# Patient Record
Sex: FEMALE | Race: WHITE | NOT HISPANIC OR LATINO | Employment: FULL TIME | URBAN - METROPOLITAN AREA
[De-identification: names, ages, dates, MRNs, and addresses within clinical notes are randomized per-mention and may not be internally consistent; named-entity substitution may affect disease eponyms.]

---

## 2018-03-12 ENCOUNTER — TELEMEDICINE2 (OUTPATIENT)
Dept: URGENT CARE | Facility: CLINIC | Age: 38
End: 2018-03-12

## 2018-03-12 DIAGNOSIS — M62.838 MUSCLE SPASM: ICD-10-CM

## 2018-03-12 DIAGNOSIS — F43.9 STRESS: ICD-10-CM

## 2018-03-12 PROCEDURE — 99214 OFFICE O/P EST MOD 30 MIN: CPT | Performed by: FAMILY MEDICINE

## 2018-03-12 RX ORDER — CYCLOBENZAPRINE HCL 10 MG
10 TABLET ORAL 3 TIMES DAILY PRN
Qty: 30 TAB | Refills: 0 | Status: SHIPPED | OUTPATIENT
Start: 2018-03-12

## 2018-03-12 RX ORDER — ALPRAZOLAM 0.5 MG/1
0.5 TABLET ORAL NIGHTLY PRN
COMMUNITY

## 2018-03-12 NOTE — PROGRESS NOTES
Subjective:      Lauryn Aguillon is a 38 y.o. female who presents with Medication Problem (pt wanting to get back on anxiety & muscle reler meds)        Patient was presented for a telehealth consultation via secure and encrypted videoconferencing technology.    It was explained to patient that this encounter was done using secure and encrypted videoconferencing equipment, the patient gave us consent and patient ID was confirmed for us.        - says has long hx of stress anxiety and muscle tension in upper back/neck. Acting up past 2-3 weeks, Uses flexeril and xanax to help. Is out of these meds and unable to see her Dr until later this week and is wanting refills. No SI/HI              HPI    ROS       Objective:     There were no vitals taken for this visit.     Physical Exam  Patient appears through video monitor to be alert and in no distress and breathing normally               Assessment/Plan:         1. Muscle spasm  cyclobenzaprine (FLEXERIL) 10 MG Tab   2. Stress         Explained to patient that an in person visit is of higher quiality care, preferred and recommended and decreases chances of misdiagnoses and/or complications, but as she wishes to use telephone service I will e-scribe flexeril and explained to her I would not be able to send Xanax to store for her.

## 2020-11-19 ENCOUNTER — OFFICE VISIT (OUTPATIENT)
Dept: URGENT CARE | Facility: CLINIC | Age: 40
End: 2020-11-19
Payer: COMMERCIAL

## 2020-11-19 DIAGNOSIS — R00.0 TACHYCARDIA: ICD-10-CM

## 2020-11-19 PROCEDURE — 99214 OFFICE O/P EST MOD 30 MIN: CPT | Performed by: FAMILY MEDICINE

## 2020-11-19 RX ORDER — AZITHROMYCIN 250 MG/1
TABLET, FILM COATED ORAL
Status: ON HOLD | COMMUNITY
Start: 2020-10-29 | End: 2020-11-20

## 2020-11-20 ENCOUNTER — APPOINTMENT (OUTPATIENT)
Dept: CARDIOLOGY | Facility: MEDICAL CENTER | Age: 40
End: 2020-11-20
Attending: STUDENT IN AN ORGANIZED HEALTH CARE EDUCATION/TRAINING PROGRAM
Payer: COMMERCIAL

## 2020-11-20 ENCOUNTER — HOSPITAL ENCOUNTER (OUTPATIENT)
Facility: MEDICAL CENTER | Age: 40
End: 2020-11-20
Attending: EMERGENCY MEDICINE | Admitting: STUDENT IN AN ORGANIZED HEALTH CARE EDUCATION/TRAINING PROGRAM
Payer: COMMERCIAL

## 2020-11-20 ENCOUNTER — APPOINTMENT (OUTPATIENT)
Dept: RADIOLOGY | Facility: MEDICAL CENTER | Age: 40
End: 2020-11-20
Attending: EMERGENCY MEDICINE
Payer: COMMERCIAL

## 2020-11-20 ENCOUNTER — APPOINTMENT (OUTPATIENT)
Dept: RADIOLOGY | Facility: MEDICAL CENTER | Age: 40
End: 2020-11-20
Attending: STUDENT IN AN ORGANIZED HEALTH CARE EDUCATION/TRAINING PROGRAM
Payer: COMMERCIAL

## 2020-11-20 VITALS
RESPIRATION RATE: 13 BRPM | WEIGHT: 242 LBS | DIASTOLIC BLOOD PRESSURE: 74 MMHG | BODY MASS INDEX: 37.9 KG/M2 | TEMPERATURE: 96.5 F | HEART RATE: 121 BPM | OXYGEN SATURATION: 96 % | SYSTOLIC BLOOD PRESSURE: 126 MMHG

## 2020-11-20 VITALS
DIASTOLIC BLOOD PRESSURE: 72 MMHG | WEIGHT: 240.96 LBS | HEIGHT: 67 IN | BODY MASS INDEX: 37.82 KG/M2 | HEART RATE: 92 BPM | TEMPERATURE: 97.8 F | SYSTOLIC BLOOD PRESSURE: 109 MMHG | RESPIRATION RATE: 18 BRPM | OXYGEN SATURATION: 97 %

## 2020-11-20 DIAGNOSIS — R00.0 TACHYCARDIA: ICD-10-CM

## 2020-11-20 DIAGNOSIS — F41.9 ANXIETY: ICD-10-CM

## 2020-11-20 DIAGNOSIS — E05.90 THYROTOXICOSIS WITHOUT THYROID STORM, UNSPECIFIED THYROTOXICOSIS TYPE: ICD-10-CM

## 2020-11-20 DIAGNOSIS — E05.90 HYPERTHYROIDISM: ICD-10-CM

## 2020-11-20 PROBLEM — F17.200 NICOTINE DEPENDENCE DUE TO VAPING NON-TOBACCO PRODUCT: Status: ACTIVE | Noted: 2020-11-20

## 2020-11-20 LAB
ALBUMIN SERPL BCP-MCNC: 4.2 G/DL (ref 3.2–4.9)
ALBUMIN/GLOB SERPL: 1.8 G/DL
ALP SERPL-CCNC: 67 U/L (ref 30–99)
ALT SERPL-CCNC: 30 U/L (ref 2–50)
ANION GAP SERPL CALC-SCNC: 10 MMOL/L (ref 7–16)
AST SERPL-CCNC: 21 U/L (ref 12–45)
BASOPHILS # BLD AUTO: 0.4 % (ref 0–1.8)
BASOPHILS # BLD: 0.03 K/UL (ref 0–0.12)
BILIRUB SERPL-MCNC: 0.7 MG/DL (ref 0.1–1.5)
BUN SERPL-MCNC: 14 MG/DL (ref 8–22)
CALCIUM SERPL-MCNC: 9.5 MG/DL (ref 8.5–10.5)
CHLORIDE SERPL-SCNC: 104 MMOL/L (ref 96–112)
CO2 SERPL-SCNC: 24 MMOL/L (ref 20–33)
COVID ORDER STATUS COVID19: NORMAL
CREAT SERPL-MCNC: 0.53 MG/DL (ref 0.5–1.4)
EKG IMPRESSION: NORMAL
EOSINOPHIL # BLD AUTO: 0.23 K/UL (ref 0–0.51)
EOSINOPHIL NFR BLD: 3.4 % (ref 0–6.9)
ERYTHROCYTE [DISTWIDTH] IN BLOOD BY AUTOMATED COUNT: 33.9 FL (ref 35.9–50)
GLOBULIN SER CALC-MCNC: 2.3 G/DL (ref 1.9–3.5)
GLUCOSE SERPL-MCNC: 103 MG/DL (ref 65–99)
HCT VFR BLD AUTO: 42.7 % (ref 37–47)
HGB BLD-MCNC: 13.9 G/DL (ref 12–16)
IMM GRANULOCYTES # BLD AUTO: 0.02 K/UL (ref 0–0.11)
IMM GRANULOCYTES NFR BLD AUTO: 0.3 % (ref 0–0.9)
LV EJECT FRACT  99904: 60
LV EJECT FRACT MOD 2C 99903: 55.44
LV EJECT FRACT MOD 4C 99902: 75.85
LV EJECT FRACT MOD BP 99901: 66.75
LYMPHOCYTES # BLD AUTO: 2.31 K/UL (ref 1–4.8)
LYMPHOCYTES NFR BLD: 34 % (ref 22–41)
MCH RBC QN AUTO: 26.1 PG (ref 27–33)
MCHC RBC AUTO-ENTMCNC: 32.6 G/DL (ref 33.6–35)
MCV RBC AUTO: 80.3 FL (ref 81.4–97.8)
MONOCYTES # BLD AUTO: 0.86 K/UL (ref 0–0.85)
MONOCYTES NFR BLD AUTO: 12.7 % (ref 0–13.4)
NEUTROPHILS # BLD AUTO: 3.34 K/UL (ref 2–7.15)
NEUTROPHILS NFR BLD: 49.2 % (ref 44–72)
NRBC # BLD AUTO: 0 K/UL
NRBC BLD-RTO: 0 /100 WBC
PLATELET # BLD AUTO: 260 K/UL (ref 164–446)
PMV BLD AUTO: 10.1 FL (ref 9–12.9)
POTASSIUM SERPL-SCNC: 3.7 MMOL/L (ref 3.6–5.5)
PROT SERPL-MCNC: 6.5 G/DL (ref 6–8.2)
RBC # BLD AUTO: 5.32 M/UL (ref 4.2–5.4)
SARS-COV-2 RNA RESP QL NAA+PROBE: NOTDETECTED
SODIUM SERPL-SCNC: 138 MMOL/L (ref 135–145)
SPECIMEN SOURCE: NORMAL
T3FREE SERPL-MCNC: 16 PG/ML (ref 2–4.4)
T4 FREE SERPL-MCNC: 3.45 NG/DL (ref 0.93–1.7)
T4 FREE SERPL-MCNC: 3.46 NG/DL (ref 0.93–1.7)
THYROPEROXIDASE AB SERPL-ACNC: 186 IU/ML (ref 0–9)
TROPONIN T SERPL-MCNC: <6 NG/L (ref 6–19)
TSH SERPL DL<=0.005 MIU/L-ACNC: <0.005 UIU/ML (ref 0.38–5.33)
WBC # BLD AUTO: 6.8 K/UL (ref 4.8–10.8)

## 2020-11-20 PROCEDURE — 700102 HCHG RX REV CODE 250 W/ 637 OVERRIDE(OP): Performed by: STUDENT IN AN ORGANIZED HEALTH CARE EDUCATION/TRAINING PROGRAM

## 2020-11-20 PROCEDURE — 84481 FREE ASSAY (FT-3): CPT

## 2020-11-20 PROCEDURE — 84439 ASSAY OF FREE THYROXINE: CPT

## 2020-11-20 PROCEDURE — 84484 ASSAY OF TROPONIN QUANT: CPT

## 2020-11-20 PROCEDURE — 99406 BEHAV CHNG SMOKING 3-10 MIN: CPT | Performed by: STUDENT IN AN ORGANIZED HEALTH CARE EDUCATION/TRAINING PROGRAM

## 2020-11-20 PROCEDURE — 93306 TTE W/DOPPLER COMPLETE: CPT

## 2020-11-20 PROCEDURE — 700111 HCHG RX REV CODE 636 W/ 250 OVERRIDE (IP): Performed by: EMERGENCY MEDICINE

## 2020-11-20 PROCEDURE — A9270 NON-COVERED ITEM OR SERVICE: HCPCS | Performed by: STUDENT IN AN ORGANIZED HEALTH CARE EDUCATION/TRAINING PROGRAM

## 2020-11-20 PROCEDURE — 76536 US EXAM OF HEAD AND NECK: CPT

## 2020-11-20 PROCEDURE — 96374 THER/PROPH/DIAG INJ IV PUSH: CPT

## 2020-11-20 PROCEDURE — 71045 X-RAY EXAM CHEST 1 VIEW: CPT

## 2020-11-20 PROCEDURE — 93306 TTE W/DOPPLER COMPLETE: CPT | Mod: 26 | Performed by: INTERNAL MEDICINE

## 2020-11-20 PROCEDURE — 80053 COMPREHEN METABOLIC PANEL: CPT

## 2020-11-20 PROCEDURE — 700105 HCHG RX REV CODE 258: Performed by: EMERGENCY MEDICINE

## 2020-11-20 PROCEDURE — 84442 ASSAY OF THYROID ACTIVITY: CPT

## 2020-11-20 PROCEDURE — 99285 EMERGENCY DEPT VISIT HI MDM: CPT

## 2020-11-20 PROCEDURE — 85025 COMPLETE CBC W/AUTO DIFF WBC: CPT

## 2020-11-20 PROCEDURE — 93005 ELECTROCARDIOGRAM TRACING: CPT | Performed by: EMERGENCY MEDICINE

## 2020-11-20 PROCEDURE — 86376 MICROSOMAL ANTIBODY EACH: CPT

## 2020-11-20 PROCEDURE — 99235 HOSP IP/OBS SAME DATE MOD 70: CPT | Mod: 25 | Performed by: STUDENT IN AN ORGANIZED HEALTH CARE EDUCATION/TRAINING PROGRAM

## 2020-11-20 PROCEDURE — 83520 IMMUNOASSAY QUANT NOS NONAB: CPT

## 2020-11-20 PROCEDURE — G0378 HOSPITAL OBSERVATION PER HR: HCPCS

## 2020-11-20 PROCEDURE — 36415 COLL VENOUS BLD VENIPUNCTURE: CPT

## 2020-11-20 PROCEDURE — U0003 INFECTIOUS AGENT DETECTION BY NUCLEIC ACID (DNA OR RNA); SEVERE ACUTE RESPIRATORY SYNDROME CORONAVIRUS 2 (SARS-COV-2) (CORONAVIRUS DISEASE [COVID-19]), AMPLIFIED PROBE TECHNIQUE, MAKING USE OF HIGH THROUGHPUT TECHNOLOGIES AS DESCRIBED BY CMS-2020-01-R: HCPCS

## 2020-11-20 PROCEDURE — 84443 ASSAY THYROID STIM HORMONE: CPT

## 2020-11-20 RX ORDER — CYCLOBENZAPRINE HCL 10 MG
10 TABLET ORAL 3 TIMES DAILY PRN
Status: DISCONTINUED | OUTPATIENT
Start: 2020-11-20 | End: 2020-11-20 | Stop reason: HOSPADM

## 2020-11-20 RX ORDER — ALPRAZOLAM 0.5 MG/1
0.5 TABLET ORAL NIGHTLY PRN
Status: DISCONTINUED | OUTPATIENT
Start: 2020-11-20 | End: 2020-11-20 | Stop reason: HOSPADM

## 2020-11-20 RX ORDER — METHIMAZOLE 5 MG/1
5 TABLET ORAL 3 TIMES DAILY
Status: DISCONTINUED | OUTPATIENT
Start: 2020-11-20 | End: 2020-11-20 | Stop reason: HOSPADM

## 2020-11-20 RX ORDER — PROPRANOLOL HYDROCHLORIDE 20 MG/1
20 TABLET ORAL EVERY 4 HOURS PRN
Status: DISCONTINUED | OUTPATIENT
Start: 2020-11-20 | End: 2020-11-20

## 2020-11-20 RX ORDER — BISACODYL 10 MG
10 SUPPOSITORY, RECTAL RECTAL
Status: DISCONTINUED | OUTPATIENT
Start: 2020-11-20 | End: 2020-11-20 | Stop reason: HOSPADM

## 2020-11-20 RX ORDER — ACETAMINOPHEN 500 MG
1000 TABLET ORAL EVERY 6 HOURS PRN
Status: DISCONTINUED | OUTPATIENT
Start: 2020-11-20 | End: 2020-11-20 | Stop reason: HOSPADM

## 2020-11-20 RX ORDER — LORAZEPAM 2 MG/ML
2 INJECTION INTRAMUSCULAR ONCE
Status: COMPLETED | OUTPATIENT
Start: 2020-11-20 | End: 2020-11-20

## 2020-11-20 RX ORDER — PROPRANOLOL HYDROCHLORIDE 20 MG/1
60 TABLET ORAL ONCE
Status: DISCONTINUED | OUTPATIENT
Start: 2020-11-20 | End: 2020-11-20

## 2020-11-20 RX ORDER — PROMETHAZINE HYDROCHLORIDE 25 MG/1
12.5-25 SUPPOSITORY RECTAL EVERY 4 HOURS PRN
Status: DISCONTINUED | OUTPATIENT
Start: 2020-11-20 | End: 2020-11-20 | Stop reason: HOSPADM

## 2020-11-20 RX ORDER — LABETALOL HYDROCHLORIDE 5 MG/ML
10 INJECTION, SOLUTION INTRAVENOUS EVERY 4 HOURS PRN
Status: DISCONTINUED | OUTPATIENT
Start: 2020-11-20 | End: 2020-11-20 | Stop reason: HOSPADM

## 2020-11-20 RX ORDER — SODIUM CHLORIDE 9 MG/ML
1000 INJECTION, SOLUTION INTRAVENOUS ONCE
Status: COMPLETED | OUTPATIENT
Start: 2020-11-20 | End: 2020-11-20

## 2020-11-20 RX ORDER — PROPRANOLOL HYDROCHLORIDE 20 MG/1
20 TABLET ORAL EVERY 8 HOURS
Qty: 90 TAB | Refills: 11 | Status: SHIPPED | OUTPATIENT
Start: 2020-11-20 | End: 2022-04-05

## 2020-11-20 RX ORDER — ONDANSETRON 2 MG/ML
4 INJECTION INTRAMUSCULAR; INTRAVENOUS EVERY 4 HOURS PRN
Status: DISCONTINUED | OUTPATIENT
Start: 2020-11-20 | End: 2020-11-20 | Stop reason: HOSPADM

## 2020-11-20 RX ORDER — POLYETHYLENE GLYCOL 3350 17 G/17G
1 POWDER, FOR SOLUTION ORAL
Status: DISCONTINUED | OUTPATIENT
Start: 2020-11-20 | End: 2020-11-20 | Stop reason: HOSPADM

## 2020-11-20 RX ORDER — AMOXICILLIN 250 MG
2 CAPSULE ORAL 2 TIMES DAILY
Status: DISCONTINUED | OUTPATIENT
Start: 2020-11-20 | End: 2020-11-20 | Stop reason: HOSPADM

## 2020-11-20 RX ORDER — POTASSIUM CHLORIDE 20 MEQ/1
20 TABLET, EXTENDED RELEASE ORAL DAILY
Status: DISCONTINUED | OUTPATIENT
Start: 2020-11-20 | End: 2020-11-20 | Stop reason: HOSPADM

## 2020-11-20 RX ORDER — NICOTINE 21 MG/24HR
14 PATCH, TRANSDERMAL 24 HOURS TRANSDERMAL
Status: DISCONTINUED | OUTPATIENT
Start: 2020-11-20 | End: 2020-11-20 | Stop reason: HOSPADM

## 2020-11-20 RX ORDER — ONDANSETRON 4 MG/1
4 TABLET, ORALLY DISINTEGRATING ORAL EVERY 4 HOURS PRN
Status: DISCONTINUED | OUTPATIENT
Start: 2020-11-20 | End: 2020-11-20 | Stop reason: HOSPADM

## 2020-11-20 RX ORDER — PROMETHAZINE HYDROCHLORIDE 25 MG/1
12.5-25 TABLET ORAL EVERY 4 HOURS PRN
Status: DISCONTINUED | OUTPATIENT
Start: 2020-11-20 | End: 2020-11-20 | Stop reason: HOSPADM

## 2020-11-20 RX ORDER — ACETAMINOPHEN 325 MG/1
650 TABLET ORAL EVERY 6 HOURS PRN
Status: DISCONTINUED | OUTPATIENT
Start: 2020-11-20 | End: 2020-11-20

## 2020-11-20 RX ORDER — PROPRANOLOL HYDROCHLORIDE 20 MG/1
20 TABLET ORAL EVERY 8 HOURS
Status: DISCONTINUED | OUTPATIENT
Start: 2020-11-20 | End: 2020-11-20 | Stop reason: HOSPADM

## 2020-11-20 RX ORDER — METHIMAZOLE 5 MG/1
10 TABLET ORAL 3 TIMES DAILY
Qty: 90 TAB | Refills: 3 | Status: SHIPPED | OUTPATIENT
Start: 2020-11-20

## 2020-11-20 RX ORDER — PROCHLORPERAZINE EDISYLATE 5 MG/ML
5-10 INJECTION INTRAMUSCULAR; INTRAVENOUS EVERY 4 HOURS PRN
Status: DISCONTINUED | OUTPATIENT
Start: 2020-11-20 | End: 2020-11-20 | Stop reason: HOSPADM

## 2020-11-20 RX ORDER — ACETAMINOPHEN 500 MG
1000 TABLET ORAL EVERY 6 HOURS PRN
Qty: 30 TAB | Refills: 0 | Status: SHIPPED | OUTPATIENT
Start: 2020-11-20 | End: 2023-01-31

## 2020-11-20 RX ADMIN — LORAZEPAM 2 MG: 2 INJECTION INTRAMUSCULAR; INTRAVENOUS at 01:37

## 2020-11-20 RX ADMIN — SODIUM CHLORIDE 1000 ML: 9 INJECTION, SOLUTION INTRAVENOUS at 01:37

## 2020-11-20 RX ADMIN — PROPRANOLOL HYDROCHLORIDE 20 MG: 20 TABLET ORAL at 15:05

## 2020-11-20 RX ADMIN — ACETAMINOPHEN 1000 MG: 500 TABLET ORAL at 09:11

## 2020-11-20 RX ADMIN — POTASSIUM CHLORIDE 20 MEQ: 1500 TABLET, EXTENDED RELEASE ORAL at 11:22

## 2020-11-20 RX ADMIN — PROPRANOLOL HYDROCHLORIDE 20 MG: 20 TABLET ORAL at 04:35

## 2020-11-20 ASSESSMENT — COGNITIVE AND FUNCTIONAL STATUS - GENERAL
MOBILITY SCORE: 24
SUGGESTED CMS G CODE MODIFIER DAILY ACTIVITY: CH
SUGGESTED CMS G CODE MODIFIER MOBILITY: CH
DAILY ACTIVITIY SCORE: 24

## 2020-11-20 ASSESSMENT — PATIENT HEALTH QUESTIONNAIRE - PHQ9
2. FEELING DOWN, DEPRESSED, IRRITABLE, OR HOPELESS: NOT AT ALL
1. LITTLE INTEREST OR PLEASURE IN DOING THINGS: NOT AT ALL
SUM OF ALL RESPONSES TO PHQ9 QUESTIONS 1 AND 2: 0

## 2020-11-20 ASSESSMENT — LIFESTYLE VARIABLES
TOTAL SCORE: 0
EVER FELT BAD OR GUILTY ABOUT YOUR DRINKING: NO
HAVE YOU EVER FELT YOU SHOULD CUT DOWN ON YOUR DRINKING: NO
DO YOU DRINK ALCOHOL: NO
ALCOHOL_USE: NO
AVERAGE NUMBER OF DAYS PER WEEK YOU HAVE A DRINK CONTAINING ALCOHOL: 0
EVER HAD A DRINK FIRST THING IN THE MORNING TO STEADY YOUR NERVES TO GET RID OF A HANGOVER: NO
CONSUMPTION TOTAL: NEGATIVE
DOES PATIENT WANT TO STOP DRINKING: NO
ON A TYPICAL DAY WHEN YOU DRINK ALCOHOL HOW MANY DRINKS DO YOU HAVE: 0
TOTAL SCORE: 0
HAVE PEOPLE ANNOYED YOU BY CRITICIZING YOUR DRINKING: NO
TOTAL SCORE: 0
HOW MANY TIMES IN THE PAST YEAR HAVE YOU HAD 5 OR MORE DRINKS IN A DAY: 0

## 2020-11-20 ASSESSMENT — FIBROSIS 4 INDEX: FIB4 SCORE: 0.6

## 2020-11-20 ASSESSMENT — ENCOUNTER SYMPTOMS
PALPITATIONS: 1
NERVOUS/ANXIOUS: 1

## 2020-11-20 NOTE — ED NOTES
Covid swab collected and sent. Report given to MARILYN Pena. Pt transferred to T816-1 with RN, all belongings accounted for and sent with pt

## 2020-11-20 NOTE — DISCHARGE PLANNING
LSW met with patient at bedside and confirmed information on facesheet.    Pt's  will be available to  the pt upon d/c.    Pt lives with her  and her 18 year old daughter. Pt is independent in ADL's and IADL's. Pt is seen by Dr. Montejo.    Pt reports she has lost 3 people close to her in the last week. Pt believes her stress and anxiety from this led her here. Pt reports feeling much better and denies need for any resources.    LSW does not anticipate any further d/c needs.    Care Transition Team Assessment    Information Source  Orientation : Oriented x 4  Information Given By: Patient  Who is responsible for making decisions for patient? : Patient    Readmission Evaluation  Is this a readmission?: No    Elopement Risk  Legal Hold: No  Ambulatory or Self Mobile in Wheelchair: Yes  Disoriented: No  Psychiatric Symptoms: None  History of Wandering: No  Elopement this Admit: No  Vocalizing Wanting to Leave: No  Displays Behaviors, Body Language Wanting to Leave: No-Not at Risk for Elopement    Interdisciplinary Discharge Planning  Patient or legal guardian wants to designate a caregiver: No    Discharge Preparedness  What is your plan after discharge?: Home with help  What are your discharge supports?: Spouse  Prior Functional Level: Ambulatory, Drives Self, Independent with Activities of Daily Living, Independent with Medication Management  Difficulity with ADLs: None  Difficulity with IADLs: None    Functional Assesment  Prior Functional Level: Ambulatory, Drives Self, Independent with Activities of Daily Living, Independent with Medication Management    Finances  Financial Barriers to Discharge: No  Prescription Coverage: Yes    Vision / Hearing Impairment  Vision Impairment : Yes  Right Eye Vision: Wears Contacts, Impaired  Left Eye Vision: Wears Contacts, Impaired  Hearing Impairment : No         Advance Directive  Advance Directive?: None    Domestic Abuse  Have you ever been the victim of  abuse or violence?: No  Physical Abuse or Sexual Abuse: No  Verbal Abuse or Emotional Abuse: No  Possible Abuse/Neglect Reported to:: Not Applicable    Psychological Assessment  History of Substance Abuse: None  History of Psychiatric Problems: Yes(anxiety)  Non-compliant with Treatment: No  Newly Diagnosed Illness: Yes    Discharge Risks or Barriers  Discharge risks or barriers?: No  Patient risk factors: Recent loss

## 2020-11-20 NOTE — PROGRESS NOTES
Subjective:     Chief Complaint   Patient presents with   • Palpitations     X 3 days. 120/135 Bpm. Hx father has CHF ([employer]) not sure if his heart disease is related to a Hx of drug abuse.    • Anxiety             Palpitations   This is a recurrent problem. The current episode started 4 d ago. Episode frequency: daily.  The problem has been unchanged.   The symptoms are aggravated by - personal stressors - she has had several deaths in family. Associated symptoms include: sob with exertion . Pertinent negatives include no chest fullness, chest pain, sob or coughing.  Pt  has tried rest for the symptoms. The treatment provided mild relief.     Past med hx:  Anxiety      Social History     Tobacco Use   • Smoking status: Former Smoker   • Smokeless tobacco: Never Used   Substance Use Topics   • Alcohol use: Yes     Alcohol/week: 3.0 oz     Types: 5 Glasses of wine per week   • Drug use: No             Review of Systems   Constitutional: Negative for fever, chills and malaise/fatigue.   Eyes: Negative for vision changes, d/c.    Respiratory: Negative for cough and sputum production.    Cardiovascular: neg  for chest pain .    + palpitations.   Gastrointestinal: Negative for nausea, vomiting, abdominal pain, diarrhea and constipation.   Genitourinary: Negative for dysuria, urgency and frequency.   Skin: Negative for rash or  itching.   Neurological: Negative for dizziness and tingling.   Psychiatric/Behavioral: Negative for depression.   Hematologic/lymphatic - denies bruising or excessive bleeding  All other systems reviewed and are negative.           Objective:     /74 (BP Location: Right arm, Patient Position: Sitting, BP Cuff Size: Adult)   Pulse (!) 121   Temp 35.8 °C (96.5 °F) (Temporal)   Resp 13   Wt 109.8 kg (242 lb)   SpO2 96%       Physical Exam   Constitutional: pt is oriented to person, place, and time. Pt appears well-developed. No distress.   HENT:   Head: Normocephalic and atraumatic.    Eyes: Conjunctivae and EOM are normal. Pupils are equal, round, and reactive to light. Right conjunctiva is not injected. Left conjunctiva is not injected.      Cardiovascular: TACHY rate, regular rhythm and normal heart sounds.  Exam reveals no friction rub.    No murmur heard.  Pulmonary/Chest: Effort normal and breath sounds normal. No respiratory distress. Pt has no wheezes or rales.   Neurological: pt is alert and oriented to person, place, and time. No cranial nerve deficit.   Skin: Skin is warm. Pt is not diaphoretic. No erythema.   Psychiatric: pt's mood and affect appears appropriate   Nursing note and vitals reviewed.    EKG interpretation -  sinus TACHY. No ST or QRS morphology changes. QT interval is normal. Axis is positive. No signs of ischemia.            Assessment/Plan:      sinus tachycardia    Will require a higher level of care - pt will be seen at [facility] ED immediately

## 2020-11-20 NOTE — PROGRESS NOTES
Assumed care of patient and report received from ER RN. Patient oriented to room and educated on importance of calling, bed controls on, bed locked and in lowest position, call light with patient. Appropriate fall precautions in place. Belongs and bedside table in reach. No needs at this time. Dr. Lynne at bedside.

## 2020-11-20 NOTE — H&P
"Hospital Medicine History & Physical Note    Date of Service  11/20/2020    Primary Care Physician  Pcp Pt States None    Consultants  none    Code Status  Full Code    Chief Complaint  Chief Complaint   Patient presents with   • Tachycardia     Patient states she has had a \"racing heart rate for the past three days\"    • Shortness of Breath     patient states she feels anxious lately due to losing three people in her family lately. dolores is unsure as to whether SOB is coming from being more active lately or anxiety       History of Presenting Illness  40 y.o. female who presents with sensations of palpitations and severe anxiety.  Patient states that her family has been going through extensive amounts of stress.  Several days ago they lost her father-in-law and the next day progressed and to chronic illness.  She describes occasionally a chest tightness but no true chest pressure or  nausea or vomiting.  She occasionally takes Xanax for anxiety and did take 1 agrees yesterday and felt some alleviation of the symptoms.  In the ED patient is tearful,anxious, and denying any recent chest pressure,  or syncope.  Patient does describe episodes of diaphoresis and hot flashes with recent weight loss of 5 pounds, she feels as though her hands have been shaking for the past year.   Review of the chart shows patient was seen yesterday for evaluation of several days of palpitations.  Per the note this is a recurrent problem with episodic nature.  Frequently exacerbated by personal stressors.  EKG from this visit is documented as sinus tachycardia without acute ST or QRS morphology changes.  There was no QT interval abnormalities and a normal axis.  In the emergency department patient is seen to have low TSH and elevated T4, she is tachycardic in the emergency department and is admitted to the hospitalist service for further evaluation of her new onset hyperthyroidism, thyroid toxicosis.  Propanolol as needed is ordered for " her tachycardia and patient is admitted to telemetry.      Review of Systems  Review of Systems   Cardiovascular: Positive for palpitations.   Psychiatric/Behavioral: The patient is nervous/anxious.    All other systems reviewed and are negative.      Past Medical History  Anxiety    Surgical History  Left knee surgery    Family History  Mother: thyroid  Problem  Grandmother : thyroid problem  Father : DM2    Social History   reports that she has quit smoking. She has never used smokeless tobacco. She reports current alcohol use of about 3.0 oz of alcohol per week. She reports that she does not use drugs.  Current vaping daily    Allergies  No Known Allergies    Medications  Prior to Admission Medications   Prescriptions Last Dose Informant Patient Reported? Taking?   ALPRAZolam (XANAX) 0.5 MG Tab 11/19/2020 at Unknown time  Yes No   Sig: Take 0.5 mg by mouth at bedtime as needed for Sleep.   GARLIC PO 11/20/2020 at Unknown time  Yes No   Sig: Take  by mouth.   Multiple Vitamins-Minerals (CENTRUM SILVER ULTRA WOMENS PO) 11/20/2020 at Unknown time  Yes No   Sig: Take  by mouth.   Multiple Vitamins-Minerals (ZINC PO) 11/20/2020 at Unknown time  Yes No   Sig: Take  by mouth.   azithromycin (ZITHROMAX) 250 MG Tab   Yes No   Sig: Not taking   cyclobenzaprine (FLEXERIL) 10 MG Tab 11/20/2020 at Unknown time  No No   Sig: Take 1 Tab by mouth 3 times a day as needed.      Facility-Administered Medications: None       Physical Exam  Temp:  [36.1 °C (97 °F)-36.6 °C (97.8 °F)] 36.1 °C (97 °F)  Pulse:  [109-131] 116  Resp:  [15-21] 19  BP: (118-144)/(67-96) 119/75  SpO2:  [94 %-98 %] 97 %    Physical Exam  Vitals signs and nursing note reviewed.   Constitutional:       General: She is in acute distress.      Appearance: Normal appearance. She is normal weight.   HENT:      Head: Normocephalic and atraumatic.      Nose: Nose normal.      Mouth/Throat:      Mouth: Mucous membranes are dry.      Pharynx: Oropharynx is clear. No  oropharyngeal exudate or posterior oropharyngeal erythema.   Eyes:      Extraocular Movements: Extraocular movements intact.      Conjunctiva/sclera: Conjunctivae normal.      Pupils: Pupils are equal, round, and reactive to light.   Neck:      Musculoskeletal: Normal range of motion and neck supple.   Cardiovascular:      Rate and Rhythm: Regular rhythm. Tachycardia present.      Pulses: Normal pulses.      Heart sounds: Normal heart sounds. No murmur. No gallop.    Pulmonary:      Effort: Pulmonary effort is normal. No respiratory distress.      Breath sounds: Normal breath sounds. No stridor. No wheezing or rales.   Abdominal:      General: Abdomen is flat. Bowel sounds are normal. There is no distension.      Palpations: Abdomen is soft. There is no mass.      Tenderness: There is no abdominal tenderness. There is no guarding.   Musculoskeletal: Normal range of motion.      Right lower leg: No edema.      Left lower leg: No edema.   Skin:     General: Skin is warm and dry.      Capillary Refill: Capillary refill takes less than 2 seconds.   Neurological:      General: No focal deficit present.      Mental Status: She is alert and oriented to person, place, and time.      Comments: B/L upper extremity tremor   Psychiatric:         Mood and Affect: Mood normal.         Behavior: Behavior normal.         Laboratory:  Recent Labs     11/20/20  0110   WBC 6.8   RBC 5.32   HEMOGLOBIN 13.9   HEMATOCRIT 42.7   MCV 80.3*   MCH 26.1*   MCHC 32.6*   RDW 33.9*   PLATELETCT 260   MPV 10.1     Recent Labs     11/20/20  0110   SODIUM 138   POTASSIUM 3.7   CHLORIDE 104   CO2 24   GLUCOSE 103*   BUN 14   CREATININE 0.53   CALCIUM 9.5     Recent Labs     11/20/20  0110   ALTSGPT 30   ASTSGOT 21   ALKPHOSPHAT 67   TBILIRUBIN 0.7   GLUCOSE 103*         No results for input(s): NTPROBNP in the last 72 hours.      Recent Labs     11/20/20  0110   TROPONINT <6       Imaging:  DX-CHEST-PORTABLE (1 VIEW)   Final Result         1.  No  acute cardiopulmonary disease.            Assessment/Plan:  I anticipate this patient is appropriate for observation status at this time.    Nicotine dependence due to vaping non-tobacco product- (present on admission)  Assessment & Plan  hx of smoking and current vaping daily  Smoking education discussed and assistance in smoking cessation offered >5 minutes  Nicotine patch ordered      Tachycardia- (present on admission)  Assessment & Plan  Propranolol 20mg prn ordered   1 1/2 year hx of palpitations and tachycardia intermittently  Admitted with telemetry    Hyperthyroidism- (present on admission)  Assessment & Plan  TSH < 0.005  T4 3.45  Hx of palpitations, sweats, tachycardia, recent weight loss

## 2020-11-20 NOTE — PROGRESS NOTES
Assumed care at 0645, bedside report received from night shift RN. Pt is ST on the telemetry monitor. Patient is AO x 4 and is resting in bed. Initial assessment completed and orders reviewed. POC discussed with patient. Call light within reach and hourly rounding in place. No further questions at this time. Fall precautions in place.

## 2020-11-20 NOTE — PROGRESS NOTES
Patient discharged to home with . Pt escorted down by self and . Patient verbalizes all belongings and Rx at bedside and home with patient. Discharged instructions discussed with patient. Patient verbalizes understanding. Pt verbalizes no further questions at this time.     Monitor room notified.

## 2020-11-20 NOTE — ED PROVIDER NOTES
"ED Provider Note    CHIEF COMPLAINT  Chief Complaint   Patient presents with   • Tachycardia     Patient states she has had a \"racing heart rate for the past three days\"    • Shortness of Breath     patient states she feels anxious lately due to losing three people in her family lately. dolores is unsure as to whether SOB is coming from being more active lately or anxiety     HPI  Patient is a otherwise healthy 40-year-old female who presents emergency room for sensations of palpitations and severe anxiety.  Patient states that her family has been going through extensive amounts of stress.  Several days ago they lost her father-in-law and the next day progressed and to chronic illness.  Additionally 2 days ago he lost a close family friend to severe liver failure and she feels like she has been having extensive amounts of stress and she can feel mounting anxiety because of palpitations.  She describes occasionally a chest tightness but no true chest pressure or any diaphoresis or nausea or vomiting.  She occasionally takes Xanax for anxiety and did take 1 agrees yesterday and felt some alleviation of the symptoms.  She is tearful,anxious, and denying any recent chest pressure, diaphoresis or syncope.    PPE Note: I personally donned full PPE for all patient encounters during this visit, including being clean-shaven with an N95 respirator mask, gloves, and goggles.     Review of the chart shows patient was seen yesterday for evaluation of several days of palpitations.  Per the note this is a recurrent problem with episodic nature.  Frequently exacerbated by personal stressors.  EKG from this visit is documented as sinus tachycardia without acute ST or QRS morphology changes.  There was no QT interval abnormalities and a normal axis    REVIEW OF SYSTEMS  See HPI for further details. All other systems are negative.     PAST MEDICAL HISTORY     SOCIAL HISTORY  Social History     Tobacco Use   • Smoking status: Former " "Smoker   • Smokeless tobacco: Never Used   Substance and Sexual Activity   • Alcohol use: Yes     Alcohol/week: 3.0 oz     Types: 5 Glasses of wine per week   • Drug use: No   • Sexual activity: Yes     Partners: Male       SURGICAL HISTORY  patient denies any surgical history    CURRENT MEDICATIONS  Home Medications     Reviewed by Liliam Martin R.N. (Registered Nurse) on 11/20/20 at 0048  Med List Status: Complete   Medication Last Dose Status   ALPRAZolam (XANAX) 0.5 MG Tab 11/19/2020 Active   azithromycin (ZITHROMAX) 250 MG Tab  Active   cyclobenzaprine (FLEXERIL) 10 MG Tab 11/20/2020 Active   GARLIC PO 11/20/2020 Active   Multiple Vitamins-Minerals (CENTRUM SILVER ULTRA WOMENS PO) 11/20/2020 Active   Multiple Vitamins-Minerals (ZINC PO) 11/20/2020 Active              ALLERGIES  No Known Allergies    PHYSICAL EXAM  VITAL SIGNS: /73   Pulse (!) 114   Temp 36.6 °C (97.8 °F) (Temporal)   Resp 20   Ht 1.702 m (5' 7\")   Wt 111.5 kg (245 lb 13 oz)   SpO2 97%   Breastfeeding No   BMI 38.50 kg/m²   Pulse ox interpretation: I interpret this pulse ox as normal.  Genl: F sitting in chair comfortably, speaking clearly, appears in no acute distress   Head: NC/AT   ENT: Mucous membranes moist, posterior pharynx clear, uvula midline, nares patent bilaterally   Eyes: Normal sclera, pupils equal round reactive to light  Neck: Supple, FROM, no LAD appreciated   Pulmonary: Lungs are clear to auscultation bilaterally  Chest: No TTP  CV:  tachycardia, no murmur appreciated, pulses 2+ in both upper and lower extremities,  Abdomen: soft, NT/ND; no rebound/guarding, no masses palpated, no HSM   : no CVA or suprapubic tenderness   Musculoskeletal: Pain free ROM of the neck. Moving upper and lower extremities and spontaneous in coordinated fashion  Neuro: A&Ox4 (person, place, time, situation), speech fluent, gait steady, no focal deficits appreciated, No cerebellar signs. Sensation is grossly intact in the distal upper " and lower extremities.  5/5 strength in  and dorsiflexion/plantar flexion of the ankles  Psych: Patient has an appropriate affect and behavior.  Skin: No rash or lesions.  No pallor or jaundice.  No cyanosis.  Warm and dry.     DIAGNOSTIC STUDIES / PROCEDURES    EKG  Results for orders placed or performed during the hospital encounter of 20   EKG   Result Value Ref Range    Report       [facility] Emergency Dept.    Test Date:  2020  Pt Name:    DUYEN MOSES               Department: ER  MRN:        [id number]                      Room:       [facility]  Gender:     Female                       Technician: [id number]  :        1980                   Requested By:ER TRIAGE PROTOCOL  Order #:    [id number]                    Reading MD: Emile Finney MD    Measurements  Intervals                                Axis  Rate:       122                          P:          55  MD:         140                          QRS:        52  QRSD:       94                           T:          30  QT:         324  QTc:        462    Interpretive Statements  Sinus tachycardia  No previous ECG available for comparison  Electronically Signed On 2020 1:35:02 PST by Emile Finney MD       LABS  Labs Reviewed   CBC WITH DIFFERENTIAL - Abnormal; Notable for the following components:       Result Value    MCV 80.3 (*)     MCH 26.1 (*)     MCHC 32.6 (*)     RDW 33.9 (*)     Monos (Absolute) 0.86 (*)     All other components within normal limits   COMP METABOLIC PANEL - Abnormal; Notable for the following components:    Glucose 103 (*)     All other components within normal limits   TSH - Abnormal; Notable for the following components:    TSH <0.005 (*)     All other components within normal limits   FREE THYROXINE - Abnormal; Notable for the following components:    Free T-4 3.45 (*)     All other components within normal limits   TROPONIN   ESTIMATED GFR     RADIOLOGY  DX-CHEST-PORTABLE (1 VIEW)   Final  Result         1.  No acute cardiopulmonary disease.        COURSE & MEDICAL DECISION MAKING  Pertinent Labs & Imaging studies reviewed. (See chart for details)    DDX:  ACS unlikely  Pneumothorax  Pulmonary embolism  Aortic dissection  Drug use/withdrawal  Thyroid dysfunction  Pancreatitis  GERD  Anxiety    MDM    Initial evaluation at 1259:  Patient presents emergency room for symptoms as described above.  Initial assessment showed a patient who is very anxious, tachycardic with some lability in the rate up to 145 but down to 118 when able to calm and relax her self.  She denies any recent febrile illness, she denies any hair loss or unintentional weight loss and does not have evidence of acute goiter.  She had no suicidal ideations, no evidence of severe depression but was highly distressed regarding the recent familial losses and interpersonal stress.  IV access had been established and lab was drawn for the broad differential as noted above.  She has not have ACS chest pain, she does not have any evidence of heart failure and chest x-ray is without evidence of pneumothorax or effusions.  There is no concern for acute dissection.  EKG shows a sinus tachycardia without changes consistent with inflammatory process.  Following administration of anxiolytic she was resting comfortably but remained tachycardic and thyroid panel was obtained.  TSH is severely decreased and free T4 is greater than 3.  This likely shows thyrotoxicosis without progression to thyroid storm.  She has not had previous history of endocrine disorders.  I would recommend admission for further observation, cardiac monitoring and further diagnostic work-up regarding her new thyrotoxic diagnosis.  Patient is amenable to this I discussed the case with the hospitalist will evaluate her for admission      HYDRATION: Based on the patient's presentation of Tachycardia the patient was given IV fluids. IV Hydration was used because oral hydration was not  adequate alone. Upon recheck following hydration, the patient was improved.    FINAL IMPRESSION  Visit Diagnoses     ICD-10-CM   1. Tachycardia  R00.0   2. Anxiety  F41.9   3. Thyrotoxicosis without thyroid storm, unspecified thyrotoxicosis type  E05.90     Electronically signed by: Reg Baptiste M.D., 11/20/2020 12:59 AM

## 2020-11-20 NOTE — THERAPY
Physical Therapy   Contact Note    Patient Name: Lauryn Aguillon  Age:  40 y.o., Sex:  female  Medical Record #: [id number]  Today's Date: 11/20/2020    Per RN pt is up self in room, mobilizing well. No acute skilled PT needs at this time. PT ordered completed. Please re-order PT if pt's status changes.

## 2020-11-20 NOTE — ASSESSMENT & PLAN NOTE
hx of smoking and current vaping daily  Smoking education discussed and assistance in smoking cessation offered >5 minutes  Nicotine patch ordered

## 2020-11-20 NOTE — ED NOTES
Pt to Red 10 from triage due to anxiety and tachycardia. Pt reports loosing multiple family members within the past week and noticed her heart racing X 3 days. Went to urgent care who sent her here. EKG ordered and completed per protocol. Denies any C/P. Chart up for ERP

## 2020-11-20 NOTE — ASSESSMENT & PLAN NOTE
Propranolol 20mg prn ordered   1 1/2 year hx of palpitations and tachycardia intermittently  Admitted with telemetry

## 2020-11-20 NOTE — ED TRIAGE NOTES
"Chief Complaint   Patient presents with   • Tachycardia     Patient states she has had a \"racing heart rate for the past three days\"    • Shortness of Breath     patient states she feels anxious lately due to losing three people in her family lately. dolores is unsure as to whether SOB is coming from being more active lately or anxiety     Pt amb to triage with steady gait for above complaint. EKG order placed. Patient directly to Red 10 from triage. Report given to primary RN.      Pt is alert and oriented, speaking in full sentences, follows commands and responds appropriately to questions. NAD. Resp are even and unlabored.    This RN masked and in appropriate PPE during encounter. Patient also in mask.     Blood Pressure: 144/96, Pulse: (!) 131, Respiration: 16, Temperature: 36.6 °C (97.8 °F), Height: 170.2 cm (5' 7\"), Weight: 111.5 kg (245 lb 13 oz), BMI (Calculated): 3.97, BSA (Calculated): 0.7, Pulse Oximetry: 98 %      "

## 2020-11-21 NOTE — DISCHARGE SUMMARY
"Discharge Summary    Date of Admission: 11/20/2020  Date of Discharge: 11/20/2020  3:39 PM  Discharging Attending: Dr. Erendira DAVIS   Discharging Senior Resident: Dr. Thanh DAVIS  Discharging Intern: Dr. Sonia DAVIS    CHIEF COMPLAINT ON ADMISSION  Chief Complaint   Patient presents with   • Tachycardia     Patient states she has had a \"racing heart rate for the past three days\"    • Shortness of Breath     patient states she feels anxious lately due to losing three people in her family lately. dolores is unsure as to whether SOB is coming from being more active lately or anxiety       Reason for Admission  Evaluation for Tremors, racing heart and weight loss.    Admission Date  11/20/2020    CODE STATUS  Full Code    HPI & HOSPITAL COURSE  This is a 40 y.o. female with no significant medical history except for some anxiety episodes presented to ED with complaints of tremors in both hands, fatigue, racing heart and weight loss.  On presentation to ED, patient was hemodynamically stable except for sinus tachycardia, labs were remarkable for low TSH and elevated free T3 and T4, consistent with hyperthyroidism which explains her symptoms, patient was started on propranolol, evaluated by echocardiogram which was unremarkable for any rate related cardiomyopathy, pregnancy test was pending, methimazole was held until pregnancy test (but patient mentioned that she cannot be pregnant and acknowledges the risk), labs were remarkable for TPO antibodies, thyroid ultrasound was unremarkable. Patient was medically stable, symptoms improving with propranolol, patient mentioned she would like to be discharged. Hence, patient was discharged in medically stable condition with propranolol and methimazole with recommended follow up with PCP with pending labs for Hyperthyroid workup. An outpatient referral for endocrinology was also placed at the time of discharge.       Therefore, she is discharged in fair and stable condition to home " with close outpatient follow-up.    The patient recovered much more quickly than anticipated on admission.    PHYSICAL EXAM ON DISCHARGE  Temp:  [36.1 °C (97 °F)-36.7 °C (98.1 °F)] 36.6 °C (97.8 °F)  Pulse:  [] 92  Resp:  [15-21] 18  BP: (109-144)/(67-96) 109/72  SpO2:  [94 %-98 %] 97 %    Physical Exam  Constitutional:       Appearance: Normal appearance.   HENT:      Head: Normocephalic and atraumatic.      Nose: Nose normal.      Mouth/Throat:      Mouth: Mucous membranes are moist.   Eyes:      Extraocular Movements: Extraocular movements intact.      Pupils: Pupils are equal, round, and reactive to light.   Neck:      Musculoskeletal: Normal range of motion.   Cardiovascular:      Rate and Rhythm: Regular rhythm. Tachycardia present.      Pulses: Normal pulses.      Heart sounds: Normal heart sounds. No murmur.   Pulmonary:      Effort: Pulmonary effort is normal.      Breath sounds: Normal breath sounds.   Abdominal:      Palpations: Abdomen is soft.   Musculoskeletal: Normal range of motion.   Skin:     Capillary Refill: Capillary refill takes less than 2 seconds.   Neurological:      General: No focal deficit present.      Mental Status: She is alert and oriented to person, place, and time.   Psychiatric:         Mood and Affect: Mood normal.         Behavior: Behavior normal.         Discharge Date  11/20/2020    FOLLOW UP ITEMS POST DISCHARGE  Follow up with PCP  Follow up with endocrinology    DISCHARGE DIAGNOSES  Active Problems:    Hyperthyroidism POA: Yes    Nicotine dependence due to vaping non-tobacco product POA: Yes  Resolved Problems:    Tachycardia POA: Yes      FOLLOW UP  No future appointments.  Carlos Alexandre M.D.  [id number]  [id number]  [facility] [id number]  [phone]            MEDICATIONS ON DISCHARGE     Medication List      START taking these medications      Instructions   acetaminophen 500 MG Tabs  Commonly known as: TYLENOL   Take 2 Tabs by mouth every 6 hours as needed (Mild  Pain; (Pain scale 1-3); Temp greater than 100.5 F).  Dose: 1,000 mg     methimazole 5 MG Tabs  Commonly known as: TAPAZOLE   Take 2 Tabs by mouth 3 times a day.  Dose: 10 mg     propranolol 20 MG Tabs  Commonly known as: INDERAL   Take 1 Tab by mouth every 8 hours.  Dose: 20 mg        CONTINUE taking these medications      Instructions   ALPRAZolam 0.5 MG Tabs  Commonly known as: XANAX   Take 0.5 mg by mouth at bedtime as needed for Sleep.  Dose: 0.5 mg     CENTRUM SILVER ULTRA WOMENS PO   Take  by mouth.     cyclobenzaprine 10 mg Tabs  Commonly known as: Flexeril   Take 1 Tab by mouth 3 times a day as needed.  Dose: 10 mg     ZINC PO   Take  by mouth.        STOP taking these medications    azithromycin 250 MG Tabs  Commonly known as: ZITHROMAX     GARLIC PO            Allergies  No Known Allergies    DIET  Orders Placed This Encounter   Procedures   • Diet Order Diet: Cardiac     Standing Status:   Standing     Number of Occurrences:   1     Order Specific Question:   Diet:     Answer:   Cardiac [6]       ACTIVITY  As tolerated.  Weight bearing as tolerated    CONSULTATIONS  None    PROCEDURES  None

## 2020-11-23 LAB
T4BG SERPL-MCNC: 17.1 UG/ML (ref 13–30)
TSH RECEP AB SER-ACNC: 7.35 IU/L

## 2021-02-26 ENCOUNTER — HOSPITAL ENCOUNTER (OUTPATIENT)
Dept: LAB | Facility: MEDICAL CENTER | Age: 41
End: 2021-02-26
Attending: INTERNAL MEDICINE
Payer: COMMERCIAL

## 2021-02-26 LAB
T3FREE SERPL-MCNC: 5.46 PG/ML (ref 2–4.4)
T4 FREE SERPL-MCNC: 1.49 NG/DL (ref 0.93–1.7)
TSH SERPL DL<=0.005 MIU/L-ACNC: <0.005 UIU/ML (ref 0.38–5.33)

## 2021-02-26 PROCEDURE — 36415 COLL VENOUS BLD VENIPUNCTURE: CPT

## 2021-02-26 PROCEDURE — 84439 ASSAY OF FREE THYROXINE: CPT

## 2021-02-26 PROCEDURE — 84443 ASSAY THYROID STIM HORMONE: CPT

## 2021-02-26 PROCEDURE — 84481 FREE ASSAY (FT-3): CPT

## 2021-03-25 ENCOUNTER — HOSPITAL ENCOUNTER (OUTPATIENT)
Dept: LAB | Facility: MEDICAL CENTER | Age: 41
End: 2021-03-25
Attending: INTERNAL MEDICINE
Payer: COMMERCIAL

## 2021-03-25 LAB
T4 FREE SERPL-MCNC: 0.93 NG/DL (ref 0.93–1.7)
TSH SERPL DL<=0.005 MIU/L-ACNC: 0.01 UIU/ML (ref 0.38–5.33)

## 2021-03-25 PROCEDURE — 36415 COLL VENOUS BLD VENIPUNCTURE: CPT

## 2021-03-25 PROCEDURE — 84443 ASSAY THYROID STIM HORMONE: CPT

## 2021-03-25 PROCEDURE — 84439 ASSAY OF FREE THYROXINE: CPT

## 2021-04-30 ENCOUNTER — HOSPITAL ENCOUNTER (OUTPATIENT)
Dept: LAB | Facility: MEDICAL CENTER | Age: 41
End: 2021-04-30
Attending: INTERNAL MEDICINE
Payer: COMMERCIAL

## 2021-04-30 ENCOUNTER — HOSPITAL ENCOUNTER (EMERGENCY)
Facility: MEDICAL CENTER | Age: 41
End: 2021-05-01
Payer: COMMERCIAL

## 2021-04-30 VITALS
BODY MASS INDEX: 37.74 KG/M2 | HEART RATE: 97 BPM | TEMPERATURE: 97.3 F | OXYGEN SATURATION: 97 % | SYSTOLIC BLOOD PRESSURE: 139 MMHG | HEIGHT: 67 IN | DIASTOLIC BLOOD PRESSURE: 114 MMHG | RESPIRATION RATE: 17 BRPM

## 2021-04-30 LAB
ALBUMIN SERPL BCP-MCNC: 4.2 G/DL (ref 3.2–4.9)
ALBUMIN/GLOB SERPL: 1.6 G/DL
ALP SERPL-CCNC: 88 U/L (ref 30–99)
ALT SERPL-CCNC: 29 U/L (ref 2–50)
ANION GAP SERPL CALC-SCNC: 8 MMOL/L (ref 7–16)
AST SERPL-CCNC: 22 U/L (ref 12–45)
BASOPHILS # BLD AUTO: 0.6 % (ref 0–1.8)
BASOPHILS # BLD: 0.06 K/UL (ref 0–0.12)
BILIRUB SERPL-MCNC: 0.5 MG/DL (ref 0.1–1.5)
BUN SERPL-MCNC: 9 MG/DL (ref 8–22)
CALCIUM SERPL-MCNC: 9.1 MG/DL (ref 8.5–10.5)
CHLORIDE SERPL-SCNC: 100 MMOL/L (ref 96–112)
CO2 SERPL-SCNC: 27 MMOL/L (ref 20–33)
CREAT SERPL-MCNC: 0.8 MG/DL (ref 0.5–1.4)
EKG IMPRESSION: NORMAL
EOSINOPHIL # BLD AUTO: 0.38 K/UL (ref 0–0.51)
EOSINOPHIL NFR BLD: 3.9 % (ref 0–6.9)
ERYTHROCYTE [DISTWIDTH] IN BLOOD BY AUTOMATED COUNT: 35.8 FL (ref 35.9–50)
GLOBULIN SER CALC-MCNC: 2.6 G/DL (ref 1.9–3.5)
GLUCOSE SERPL-MCNC: 118 MG/DL (ref 65–99)
HCT VFR BLD AUTO: 43.5 % (ref 37–47)
HGB BLD-MCNC: 14.5 G/DL (ref 12–16)
IMM GRANULOCYTES # BLD AUTO: 0.06 K/UL (ref 0–0.11)
IMM GRANULOCYTES NFR BLD AUTO: 0.6 % (ref 0–0.9)
LYMPHOCYTES # BLD AUTO: 2.57 K/UL (ref 1–4.8)
LYMPHOCYTES NFR BLD: 26.5 % (ref 22–41)
MCH RBC QN AUTO: 26.6 PG (ref 27–33)
MCHC RBC AUTO-ENTMCNC: 33.3 G/DL (ref 33.6–35)
MCV RBC AUTO: 79.7 FL (ref 81.4–97.8)
MONOCYTES # BLD AUTO: 0.83 K/UL (ref 0–0.85)
MONOCYTES NFR BLD AUTO: 8.5 % (ref 0–13.4)
NEUTROPHILS # BLD AUTO: 5.81 K/UL (ref 2–7.15)
NEUTROPHILS NFR BLD: 59.9 % (ref 44–72)
NRBC # BLD AUTO: 0 K/UL
NRBC BLD-RTO: 0 /100 WBC
PLATELET # BLD AUTO: 302 K/UL (ref 164–446)
PMV BLD AUTO: 10.1 FL (ref 9–12.9)
POTASSIUM SERPL-SCNC: 3.6 MMOL/L (ref 3.6–5.5)
PROT SERPL-MCNC: 6.8 G/DL (ref 6–8.2)
RBC # BLD AUTO: 5.46 M/UL (ref 4.2–5.4)
SODIUM SERPL-SCNC: 135 MMOL/L (ref 135–145)
T3FREE SERPL-MCNC: 5.11 PG/ML (ref 2–4.4)
T4 FREE SERPL-MCNC: 1.78 NG/DL (ref 0.93–1.7)
TROPONIN T SERPL-MCNC: <6 NG/L (ref 6–19)
TSH SERPL DL<=0.005 MIU/L-ACNC: <0.005 UIU/ML (ref 0.38–5.33)
WBC # BLD AUTO: 9.7 K/UL (ref 4.8–10.8)

## 2021-04-30 PROCEDURE — 84439 ASSAY OF FREE THYROXINE: CPT

## 2021-04-30 PROCEDURE — 36415 COLL VENOUS BLD VENIPUNCTURE: CPT

## 2021-04-30 PROCEDURE — 302449 STATCHG TRIAGE ONLY (STATISTIC)

## 2021-04-30 PROCEDURE — 93005 ELECTROCARDIOGRAM TRACING: CPT

## 2021-04-30 PROCEDURE — 80053 COMPREHEN METABOLIC PANEL: CPT

## 2021-04-30 PROCEDURE — 84484 ASSAY OF TROPONIN QUANT: CPT

## 2021-04-30 PROCEDURE — 85025 COMPLETE CBC W/AUTO DIFF WBC: CPT

## 2021-04-30 PROCEDURE — 84443 ASSAY THYROID STIM HORMONE: CPT

## 2021-04-30 PROCEDURE — 84481 FREE ASSAY (FT-3): CPT

## 2021-05-01 NOTE — ED TRIAGE NOTES
"No chief complaint on file.    Pt walked into triage with a steady gait, c/o CP, arm numbness and jaw pain x2 days.  Pt recently had Graves diease and had has had medication changes, denies SOB, N/V states \"my chest just feels weird\"    Pt & staff masked and in appropriate PPE during encounter.  Pt denies fever/travel or being in contact with anyone testing positive for Covid.  Explained pt the triage process, made pt aware to tell the RN/staff of any changes/concerns, pt verbalized understanding of process and instructions given.  Pt to ER lobby.    "

## 2021-06-04 ENCOUNTER — HOSPITAL ENCOUNTER (OUTPATIENT)
Dept: LAB | Facility: MEDICAL CENTER | Age: 41
End: 2021-06-04
Attending: INTERNAL MEDICINE
Payer: COMMERCIAL

## 2021-06-04 LAB
T3FREE SERPL-MCNC: 3.1 PG/ML (ref 2–4.4)
T4 FREE SERPL-MCNC: 0.98 NG/DL (ref 0.93–1.7)

## 2021-06-04 PROCEDURE — 84481 FREE ASSAY (FT-3): CPT

## 2021-06-04 PROCEDURE — 84439 ASSAY OF FREE THYROXINE: CPT

## 2021-06-04 PROCEDURE — 36415 COLL VENOUS BLD VENIPUNCTURE: CPT

## 2021-07-02 ENCOUNTER — HOSPITAL ENCOUNTER (OUTPATIENT)
Dept: LAB | Facility: MEDICAL CENTER | Age: 41
End: 2021-07-02
Attending: INTERNAL MEDICINE
Payer: COMMERCIAL

## 2021-07-02 LAB
T3FREE SERPL-MCNC: 3.04 PG/ML (ref 2–4.4)
T4 FREE SERPL-MCNC: 0.95 NG/DL (ref 0.93–1.7)

## 2021-07-02 PROCEDURE — 36415 COLL VENOUS BLD VENIPUNCTURE: CPT

## 2021-07-02 PROCEDURE — 84481 FREE ASSAY (FT-3): CPT

## 2021-07-02 PROCEDURE — 84439 ASSAY OF FREE THYROXINE: CPT

## 2021-08-23 ENCOUNTER — HOSPITAL ENCOUNTER (OUTPATIENT)
Dept: LAB | Facility: MEDICAL CENTER | Age: 41
End: 2021-08-23
Attending: INTERNAL MEDICINE
Payer: COMMERCIAL

## 2021-08-23 PROCEDURE — 84439 ASSAY OF FREE THYROXINE: CPT

## 2021-08-23 PROCEDURE — 84481 FREE ASSAY (FT-3): CPT

## 2021-08-23 PROCEDURE — 36415 COLL VENOUS BLD VENIPUNCTURE: CPT

## 2021-08-23 PROCEDURE — 84443 ASSAY THYROID STIM HORMONE: CPT

## 2021-08-24 LAB
T3FREE SERPL-MCNC: 2.89 PG/ML (ref 2–4.4)
T4 FREE SERPL-MCNC: 0.86 NG/DL (ref 0.93–1.7)
TSH SERPL DL<=0.005 MIU/L-ACNC: 5 UIU/ML (ref 0.38–5.33)

## 2021-09-25 ENCOUNTER — HOSPITAL ENCOUNTER (OUTPATIENT)
Dept: LAB | Facility: MEDICAL CENTER | Age: 41
End: 2021-09-25
Attending: INTERNAL MEDICINE
Payer: COMMERCIAL

## 2021-09-25 LAB
T4 FREE SERPL-MCNC: 1.13 NG/DL (ref 0.93–1.7)
TSH SERPL DL<=0.005 MIU/L-ACNC: 1.99 UIU/ML (ref 0.38–5.33)

## 2021-09-25 PROCEDURE — 84443 ASSAY THYROID STIM HORMONE: CPT

## 2021-09-25 PROCEDURE — 84439 ASSAY OF FREE THYROXINE: CPT

## 2021-09-25 PROCEDURE — 36415 COLL VENOUS BLD VENIPUNCTURE: CPT

## 2021-11-13 ENCOUNTER — HOSPITAL ENCOUNTER (OUTPATIENT)
Dept: LAB | Facility: MEDICAL CENTER | Age: 41
End: 2021-11-13
Attending: INTERNAL MEDICINE
Payer: COMMERCIAL

## 2021-11-13 LAB
T4 FREE SERPL-MCNC: 1.21 NG/DL (ref 0.93–1.7)
TSH SERPL DL<=0.005 MIU/L-ACNC: 1.28 UIU/ML (ref 0.38–5.33)

## 2021-11-13 PROCEDURE — 36415 COLL VENOUS BLD VENIPUNCTURE: CPT

## 2021-11-13 PROCEDURE — 84443 ASSAY THYROID STIM HORMONE: CPT

## 2021-11-13 PROCEDURE — 84439 ASSAY OF FREE THYROXINE: CPT

## 2021-11-24 ENCOUNTER — HOSPITAL ENCOUNTER (EMERGENCY)
Facility: MEDICAL CENTER | Age: 41
End: 2021-11-24
Attending: EMERGENCY MEDICINE
Payer: COMMERCIAL

## 2021-11-24 VITALS
TEMPERATURE: 97 F | SYSTOLIC BLOOD PRESSURE: 123 MMHG | RESPIRATION RATE: 18 BRPM | WEIGHT: 257.94 LBS | BODY MASS INDEX: 39.09 KG/M2 | OXYGEN SATURATION: 91 % | HEIGHT: 68 IN | HEART RATE: 84 BPM | DIASTOLIC BLOOD PRESSURE: 83 MMHG

## 2021-11-24 DIAGNOSIS — U07.1 COVID: ICD-10-CM

## 2021-11-24 PROCEDURE — M0243 CASIRIVI AND IMDEVI INFUSION: HCPCS

## 2021-11-24 PROCEDURE — 700111 HCHG RX REV CODE 636 W/ 250 OVERRIDE (IP): Performed by: EMERGENCY MEDICINE

## 2021-11-24 PROCEDURE — 99283 EMERGENCY DEPT VISIT LOW MDM: CPT

## 2021-11-24 RX ORDER — AZITHROMYCIN 1 G/1
1 POWDER, FOR SUSPENSION ORAL ONCE
Status: SHIPPED | COMMUNITY
End: 2022-04-05

## 2021-11-24 RX ORDER — DEXAMETHASONE 6 MG/1
6 TABLET ORAL DAILY
Status: SHIPPED | COMMUNITY
End: 2022-04-05

## 2021-11-24 RX ORDER — PREDNISONE 20 MG/1
20 TABLET ORAL DAILY
COMMUNITY
Start: 2021-11-22 | End: 2021-11-24

## 2021-11-24 RX ADMIN — CASIRIVIMAB AND IMDEVIMAB 300 MG: 600; 600 INJECTION, SOLUTION, CONCENTRATE INTRAVENOUS at 19:57

## 2021-11-24 ASSESSMENT — FIBROSIS 4 INDEX: FIB4 SCORE: 0.57

## 2021-11-24 ASSESSMENT — LIFESTYLE VARIABLES: DO YOU DRINK ALCOHOL: NO

## 2021-11-25 NOTE — ED NOTES
Discharge instructions given to pt. Pt educated, verbalizes understanding. All belongings accounted for. Pt ambulated out of ED with steady gait.

## 2021-11-25 NOTE — ED PROVIDER NOTES
ED Provider Note    CHIEF COMPLAINT  Chief Complaint   Patient presents with   • Coronavirus Screening   • Sent by MD SWAN  Lauryn Aguillon is a 41 y.o. female who presents evaluation of around 7 days of cough body aches chest discomfort malaise.  The patient apparently is unvaccinated for Covid.  Patient tested positive for Covid today.  She was seen at her PCPs clinic Dr. Roche and he told him to come to the ER to get monoclonal antibody therapy.    REVIEW OF SYSTEMS  See BENY for further details.  Positive for fevers body aches cough all other systems are negative.     PAST MEDICAL HISTORY  Past Medical History:   Diagnosis Date   • Graves disease    • Hyperthyroidism        FAMILY HISTORY  Noncontributory    SOCIAL HISTORY  Social History     Socioeconomic History   • Marital status:      Spouse name: Not on file   • Number of children: Not on file   • Years of education: Not on file   • Highest education level: Not on file   Occupational History   • Not on file   Tobacco Use   • Smoking status: Former Smoker   • Smokeless tobacco: Never Used   Vaping Use   • Vaping Use: Every day   • Devices: Pre-filled or refillable cartridge   Substance and Sexual Activity   • Alcohol use: Never     Alcohol/week: 3.0 oz     Types: 5 Glasses of wine per week   • Drug use: No   • Sexual activity: Yes     Partners: Male   Other Topics Concern   • Not on file   Social History Narrative   • Not on file     Social Determinants of Health     Financial Resource Strain:    • Difficulty of Paying Living Expenses: Not on file   Food Insecurity:    • Worried About Running Out of Food in the Last Year: Not on file   • Ran Out of Food in the Last Year: Not on file   Transportation Needs:    • Lack of Transportation (Medical): Not on file   • Lack of Transportation (Non-Medical): Not on file   Physical Activity:    • Days of Exercise per Week: Not on file   • Minutes of Exercise per Session: Not on file   Stress:    •  Feeling of Stress : Not on file   Social Connections:    • Frequency of Communication with Friends and Family: Not on file   • Frequency of Social Gatherings with Friends and Family: Not on file   • Attends Taoist Services: Not on file   • Active Member of Clubs or Organizations: Not on file   • Attends Club or Organization Meetings: Not on file   • Marital Status: Not on file   Intimate Partner Violence:    • Fear of Current or Ex-Partner: Not on file   • Emotionally Abused: Not on file   • Physically Abused: Not on file   • Sexually Abused: Not on file   Housing Stability:    • Unable to Pay for Housing in the Last Year: Not on file   • Number of Places Lived in the Last Year: Not on file   • Unstable Housing in the Last Year: Not on file       SURGICAL HISTORY  Past Surgical History:   Procedure Laterality Date   • OTHER ORTHOPEDIC SURGERY      left knee, left hip       CURRENT MEDICATIONS    Current Facility-Administered Medications:   •  casirivimab-imdevimab (Regen-COV) injection 300 mg, 300 mg, Subcutaneous, Once **FOLLOWED BY** casirivimab-imdevimab (Regen-COV) injection 300 mg, 300 mg, Subcutaneous, Once **FOLLOWED BY** casirivimab-imdevimab (Regen-COV) injection 300 mg, 300 mg, Subcutaneous, Once **FOLLOWED BY** casirivimab-imdevimab (Regen-COV) injection 300 mg, 300 mg, Subcutaneous, Once, Valentin Dove M.D.    Current Outpatient Medications:   •  azithromycin (ZITHROMAX) 1 GM powder, Take 1 Packet by mouth one time., Disp: , Rfl:   •  predniSONE (DELTASONE) 20 MG Tab, Take 20 mg by mouth every day., Disp: , Rfl:   •  dexamethasone (DECADRON) 6 MG Tab, Take 6 mg by mouth every day., Disp: , Rfl:   •  acetaminophen (TYLENOL) 500 MG Tab, Take 2 Tabs by mouth every 6 hours as needed (Mild Pain; (Pain scale 1-3); Temp greater than 100.5 F)., Disp: 30 Tab, Rfl: 0  •  methimazole (TAPAZOLE) 5 MG Tab, Take 2 Tabs by mouth 3 times a day. (Patient taking differently: Take 5 mg by mouth every day.),  "Disp: 90 Tab, Rfl: 3  •  propranolol (INDERAL) 20 MG Tab, Take 1 Tab by mouth every 8 hours. (Patient taking differently: Take 20 mg by mouth as needed.), Disp: 90 Tab, Rfl: 11  •  Multiple Vitamins-Minerals (CENTRUM SILVER ULTRA WOMENS PO), Take  by mouth., Disp: , Rfl:   •  Multiple Vitamins-Minerals (ZINC PO), Take  by mouth., Disp: , Rfl:   •  ALPRAZolam (XANAX) 0.5 MG Tab, Take 0.5 mg by mouth at bedtime as needed for Sleep., Disp: , Rfl:   •  cyclobenzaprine (FLEXERIL) 10 MG Tab, Take 1 Tab by mouth 3 times a day as needed., Disp: 30 Tab, Rfl: 0      ALLERGIES  No Known Allergies    PHYSICAL EXAM  VITAL SIGNS: /106   Pulse 96   Temp 35.9 °C (96.7 °F) (Temporal)   Resp 17   Ht 1.727 m (5' 8\")   Wt 117 kg (257 lb 15 oz)   LMP 08/24/2021   SpO2 100%   Breastfeeding No   BMI 39.22 kg/m²       Constitutional: Well developed, Well nourished, No acute distress, Non-toxic appearance.   HENT: Normocephalic, Atraumatic, Bilateral external ears normal, Oropharynx moist, No oral exudates, Nose normal.   Eyes: PERRLA, EOMI, Conjunctiva normal, No discharge.   Neck: Normal range of motion, No tenderness, Supple, No stridor.   Cardiovascular: Normal heart rate, Normal rhythm, No murmurs, No rubs, No gallops.   Thorax & Lungs: Normal breath sounds, No respiratory distress, No wheezing, No chest tenderness.   Abdomen: Bowel sounds normal, Soft, No tenderness, No masses, No pulsatile masses.   Skin: Warm, Dry, No erythema, No rash.   Extremities: Intact distal pulses, No edema, No tenderness, No cyanosis, No clubbing.   Neurologic: Alert & oriented x 3, Normal motor function, Normal sensory function, No focal deficits noted.   Psychiatric: Affect normal, Judgment normal, Mood normal.       COURSE & MEDICAL DECISION MAKING  Pertinent Labs & Imaging studies reviewed. (See chart for details)  Patient recently tested positive and has been symptomatic for only around 7 days.  Given her body mass index she does qualify " for Regeneron therapy which was offered and administered.    FINAL IMPRESSION  1.  COVID-19      Electronically signed by: Valentin Dove M.D., 11/24/2021 6:54 PM

## 2021-11-25 NOTE — ED TRIAGE NOTES
Patient to ED with complaints of COVID +. She reports symptoms started 11/20. She had a + PCR by [facility] today. Told to present to ED for monoclonial antibodies.     Pt educated on ED process and asked to wait in lobby. Patient educated on importance of alerting staff to new or worsening symptoms or concerns.

## 2022-03-10 ENCOUNTER — HOSPITAL ENCOUNTER (OUTPATIENT)
Dept: LAB | Facility: MEDICAL CENTER | Age: 42
End: 2022-03-10
Attending: INTERNAL MEDICINE
Payer: COMMERCIAL

## 2022-03-10 LAB
T4 FREE SERPL-MCNC: 1.17 NG/DL (ref 0.93–1.7)
TSH SERPL DL<=0.005 MIU/L-ACNC: 1.95 UIU/ML (ref 0.38–5.33)

## 2022-03-10 PROCEDURE — 84443 ASSAY THYROID STIM HORMONE: CPT

## 2022-03-10 PROCEDURE — 36415 COLL VENOUS BLD VENIPUNCTURE: CPT

## 2022-03-10 PROCEDURE — 84439 ASSAY OF FREE THYROXINE: CPT

## 2022-03-15 ENCOUNTER — HOSPITAL ENCOUNTER (EMERGENCY)
Facility: MEDICAL CENTER | Age: 42
End: 2022-03-16
Attending: STUDENT IN AN ORGANIZED HEALTH CARE EDUCATION/TRAINING PROGRAM
Payer: COMMERCIAL

## 2022-03-15 ENCOUNTER — APPOINTMENT (OUTPATIENT)
Dept: RADIOLOGY | Facility: MEDICAL CENTER | Age: 42
End: 2022-03-15
Attending: STUDENT IN AN ORGANIZED HEALTH CARE EDUCATION/TRAINING PROGRAM
Payer: COMMERCIAL

## 2022-03-15 DIAGNOSIS — E05.90 HYPERTHYROIDISM: Primary | ICD-10-CM

## 2022-03-15 LAB
ALBUMIN SERPL BCP-MCNC: 4.5 G/DL (ref 3.2–4.9)
ALBUMIN/GLOB SERPL: 1.7 G/DL
ALP SERPL-CCNC: 80 U/L (ref 30–99)
ALT SERPL-CCNC: 25 U/L (ref 2–50)
ANION GAP SERPL CALC-SCNC: 12 MMOL/L (ref 7–16)
AST SERPL-CCNC: 17 U/L (ref 12–45)
BASOPHILS # BLD AUTO: 0.7 % (ref 0–1.8)
BASOPHILS # BLD: 0.07 K/UL (ref 0–0.12)
BILIRUB SERPL-MCNC: 0.6 MG/DL (ref 0.1–1.5)
BUN SERPL-MCNC: 15 MG/DL (ref 8–22)
CALCIUM SERPL-MCNC: 9.6 MG/DL (ref 8.4–10.2)
CHLORIDE SERPL-SCNC: 102 MMOL/L (ref 96–112)
CO2 SERPL-SCNC: 23 MMOL/L (ref 20–33)
CREAT SERPL-MCNC: 0.89 MG/DL (ref 0.5–1.4)
EKG IMPRESSION: NORMAL
EOSINOPHIL # BLD AUTO: 0.24 K/UL (ref 0–0.51)
EOSINOPHIL NFR BLD: 2.6 % (ref 0–6.9)
ERYTHROCYTE [DISTWIDTH] IN BLOOD BY AUTOMATED COUNT: 35.9 FL (ref 35.9–50)
GFR SERPLBLD CREATININE-BSD FMLA CKD-EPI: 82 ML/MIN/1.73 M 2
GLOBULIN SER CALC-MCNC: 2.6 G/DL (ref 1.9–3.5)
GLUCOSE SERPL-MCNC: 134 MG/DL (ref 65–99)
HCG SERPL QL: NEGATIVE
HCT VFR BLD AUTO: 44.5 % (ref 37–47)
HGB BLD-MCNC: 15.4 G/DL (ref 12–16)
IMM GRANULOCYTES # BLD AUTO: 0.03 K/UL (ref 0–0.11)
IMM GRANULOCYTES NFR BLD AUTO: 0.3 % (ref 0–0.9)
LYMPHOCYTES # BLD AUTO: 2.5 K/UL (ref 1–4.8)
LYMPHOCYTES NFR BLD: 26.7 % (ref 22–41)
MCH RBC QN AUTO: 27.7 PG (ref 27–33)
MCHC RBC AUTO-ENTMCNC: 34.6 G/DL (ref 33.6–35)
MCV RBC AUTO: 80 FL (ref 81.4–97.8)
MONOCYTES # BLD AUTO: 0.58 K/UL (ref 0–0.85)
MONOCYTES NFR BLD AUTO: 6.2 % (ref 0–13.4)
NEUTROPHILS # BLD AUTO: 5.96 K/UL (ref 2–7.15)
NEUTROPHILS NFR BLD: 63.5 % (ref 44–72)
NRBC # BLD AUTO: 0 K/UL
NRBC BLD-RTO: 0 /100 WBC
PLATELET # BLD AUTO: 237 K/UL (ref 164–446)
PMV BLD AUTO: 9.6 FL (ref 9–12.9)
POTASSIUM SERPL-SCNC: 3.7 MMOL/L (ref 3.6–5.5)
PROT SERPL-MCNC: 7.1 G/DL (ref 6–8.2)
RBC # BLD AUTO: 5.56 M/UL (ref 4.2–5.4)
SODIUM SERPL-SCNC: 137 MMOL/L (ref 135–145)
TROPONIN T SERPL-MCNC: <6 NG/L (ref 6–19)
WBC # BLD AUTO: 9.4 K/UL (ref 4.8–10.8)

## 2022-03-15 PROCEDURE — 99284 EMERGENCY DEPT VISIT MOD MDM: CPT

## 2022-03-15 PROCEDURE — 93005 ELECTROCARDIOGRAM TRACING: CPT | Performed by: STUDENT IN AN ORGANIZED HEALTH CARE EDUCATION/TRAINING PROGRAM

## 2022-03-15 PROCEDURE — 84703 CHORIONIC GONADOTROPIN ASSAY: CPT

## 2022-03-15 PROCEDURE — A9270 NON-COVERED ITEM OR SERVICE: HCPCS | Performed by: STUDENT IN AN ORGANIZED HEALTH CARE EDUCATION/TRAINING PROGRAM

## 2022-03-15 PROCEDURE — 700105 HCHG RX REV CODE 258: Performed by: STUDENT IN AN ORGANIZED HEALTH CARE EDUCATION/TRAINING PROGRAM

## 2022-03-15 PROCEDURE — 80053 COMPREHEN METABOLIC PANEL: CPT

## 2022-03-15 PROCEDURE — 36415 COLL VENOUS BLD VENIPUNCTURE: CPT

## 2022-03-15 PROCEDURE — 71045 X-RAY EXAM CHEST 1 VIEW: CPT

## 2022-03-15 PROCEDURE — 85025 COMPLETE CBC W/AUTO DIFF WBC: CPT

## 2022-03-15 PROCEDURE — 700102 HCHG RX REV CODE 250 W/ 637 OVERRIDE(OP): Performed by: STUDENT IN AN ORGANIZED HEALTH CARE EDUCATION/TRAINING PROGRAM

## 2022-03-15 PROCEDURE — 93005 ELECTROCARDIOGRAM TRACING: CPT

## 2022-03-15 PROCEDURE — 84484 ASSAY OF TROPONIN QUANT: CPT

## 2022-03-15 RX ORDER — SODIUM CHLORIDE 9 MG/ML
1000 INJECTION, SOLUTION INTRAVENOUS ONCE
Status: COMPLETED | OUTPATIENT
Start: 2022-03-15 | End: 2022-03-16

## 2022-03-15 RX ORDER — ACETAMINOPHEN 500 MG
1000 TABLET ORAL ONCE
Status: COMPLETED | OUTPATIENT
Start: 2022-03-15 | End: 2022-03-15

## 2022-03-15 RX ORDER — PROPYLTHIOURACIL 50 MG/1
50 TABLET ORAL DAILY
Status: SHIPPED | COMMUNITY
End: 2022-04-05

## 2022-03-15 RX ORDER — METHIMAZOLE 5 MG/1
5 TABLET ORAL 3 TIMES DAILY
Status: DISCONTINUED | OUTPATIENT
Start: 2022-03-15 | End: 2022-03-16 | Stop reason: HOSPADM

## 2022-03-15 RX ORDER — METHIMAZOLE 5 MG/1
5 TABLET ORAL 3 TIMES DAILY
Status: DISCONTINUED | OUTPATIENT
Start: 2022-03-16 | End: 2022-03-15

## 2022-03-15 RX ADMIN — SODIUM CHLORIDE 1000 ML: 9 INJECTION, SOLUTION INTRAVENOUS at 23:05

## 2022-03-15 RX ADMIN — ACETAMINOPHEN 1000 MG: 500 TABLET, FILM COATED ORAL at 23:06

## 2022-03-15 RX ADMIN — METHIMAZOLE 5 MG: 5 TABLET ORAL at 23:25

## 2022-03-15 ASSESSMENT — ENCOUNTER SYMPTOMS
BLURRED VISION: 0
LOSS OF CONSCIOUSNESS: 0
FEVER: 0
DOUBLE VISION: 0
DIZZINESS: 1
CHILLS: 1
HEADACHES: 1
FALLS: 0
NAUSEA: 1
COUGH: 0
NECK PAIN: 0
SHORTNESS OF BREATH: 1
ABDOMINAL PAIN: 0
SORE THROAT: 0
VOMITING: 0

## 2022-03-15 ASSESSMENT — FIBROSIS 4 INDEX: FIB4 SCORE: 0.58

## 2022-03-16 VITALS
TEMPERATURE: 97.9 F | RESPIRATION RATE: 16 BRPM | WEIGHT: 268.08 LBS | BODY MASS INDEX: 40.63 KG/M2 | OXYGEN SATURATION: 97 % | HEIGHT: 68 IN | HEART RATE: 77 BPM | DIASTOLIC BLOOD PRESSURE: 72 MMHG | SYSTOLIC BLOOD PRESSURE: 110 MMHG

## 2022-03-16 LAB — TROPONIN T SERPL-MCNC: <6 NG/L (ref 6–19)

## 2022-03-16 PROCEDURE — 84484 ASSAY OF TROPONIN QUANT: CPT

## 2022-03-16 NOTE — ED PROVIDER NOTES
ED Provider Note    Chief Complaint:   Chest pain    HPI:  Lauryn Aguillon is a very pleasant 42-year-old female with past history of hypothyroidism and Graves' disease, nonadherent with her hyperthyroidism medications for the past week who presents with 1 week of mild, substernal chest pressure with left upper extremity and left neck pain.  Patient reports that she slept on her left side and believes that her discomfort of her left neck and jaw and left upper extremity may be secondary after she is also concerned about a cardiac issue.  Patient endorses chronic shortness of breath ever since she had COVID-19 in December.  Patient endorses chills consistent with her thyroid disease.      Review of Systems:  Review of Systems   Constitutional: Positive for chills. Negative for fever.   HENT: Negative for congestion and sore throat.    Eyes: Negative for blurred vision and double vision.   Respiratory: Positive for shortness of breath. Negative for cough.    Cardiovascular: Positive for chest pain. Negative for leg swelling.   Gastrointestinal: Positive for nausea. Negative for abdominal pain and vomiting.   Genitourinary: Negative for dysuria and hematuria.   Musculoskeletal: Negative for falls and neck pain.   Skin: Negative for itching and rash.   Neurological: Positive for dizziness and headaches. Negative for loss of consciousness.       Past Medical History:   has a past medical history of Graves disease and Hyperthyroidism.    Social History:  Social History     Tobacco Use   • Smoking status: Former Smoker   • Smokeless tobacco: Never Used   Vaping Use   • Vaping Use: Every day   • Devices: Pre-filled or refillable cartridge   Substance and Sexual Activity   • Alcohol use: Never     Alcohol/week: 3.0 oz     Types: 5 Glasses of wine per week   • Drug use: No   • Sexual activity: Yes     Partners: Male       Surgical History:   has a past surgical history that includes other orthopedic  "surgery.    Allergies:  No Known Allergies    Physical Exam:  Vital Signs: /66   Pulse 82   Temp 36.6 °C (97.8 °F) (Temporal)   Resp 16   Ht 1.727 m (5' 8\")   Wt 122 kg (268 lb 1.3 oz)   SpO2 97%   BMI 40.76 kg/m²   Physical Exam  Vitals and nursing note reviewed.   Constitutional:       Comments: Patient is lying in bed supine, pleasant, conversant, speaking in complete sentences   HENT:      Head: Normocephalic and atraumatic.   Eyes:      Extraocular Movements: Extraocular movements intact.      Conjunctiva/sclera: Conjunctivae normal.      Pupils: Pupils are equal, round, and reactive to light.   Cardiovascular:      Pulses: Normal pulses.      Comments: HR 91  Pulmonary:      Effort: Pulmonary effort is normal. No respiratory distress.   Abdominal:      Comments: Abdomen is soft, non-tender, non-distended, non-rigid, no rebound, guarding, masses, no McBurney's point tenderness, no peritoneal signs, negative Rovsing sign, negative Barnett sign.  No CVA tenderness to palpation. Benign abdomen.   Musculoskeletal:         General: No swelling. Normal range of motion.      Cervical back: Normal range of motion. No rigidity.   Skin:     General: Skin is warm and dry.      Capillary Refill: Capillary refill takes less than 2 seconds.   Neurological:      Mental Status: She is alert.         Medical records reviewed for continuity of care.     Results for orders placed or performed during the hospital encounter of 03/15/22   CBC WITH DIFFERENTIAL   Result Value Ref Range    WBC 9.4 4.8 - 10.8 K/uL    RBC 5.56 (H) 4.20 - 5.40 M/uL    Hemoglobin 15.4 12.0 - 16.0 g/dL    Hematocrit 44.5 37.0 - 47.0 %    MCV 80.0 (L) 81.4 - 97.8 fL    MCH 27.7 27.0 - 33.0 pg    MCHC 34.6 33.6 - 35.0 g/dL    RDW 35.9 35.9 - 50.0 fL    Platelet Count 237 164 - 446 K/uL    MPV 9.6 9.0 - 12.9 fL    Neutrophils-Polys 63.50 44.00 - 72.00 %    Lymphocytes 26.70 22.00 - 41.00 %    Monocytes 6.20 0.00 - 13.40 %    Eosinophils 2.60 0.00 " - 6.90 %    Basophils 0.70 0.00 - 1.80 %    Immature Granulocytes 0.30 0.00 - 0.90 %    Nucleated RBC 0.00 /100 WBC    Neutrophils (Absolute) 5.96 2.00 - 7.15 K/uL    Lymphs (Absolute) 2.50 1.00 - 4.80 K/uL    Monos (Absolute) 0.58 0.00 - 0.85 K/uL    Eos (Absolute) 0.24 0.00 - 0.51 K/uL    Baso (Absolute) 0.07 0.00 - 0.12 K/uL    Immature Granulocytes (abs) 0.03 0.00 - 0.11 K/uL    NRBC (Absolute) 0.00 K/uL   Comp Metabolic Panel   Result Value Ref Range    Sodium 137 135 - 145 mmol/L    Potassium 3.7 3.6 - 5.5 mmol/L    Chloride 102 96 - 112 mmol/L    Co2 23 20 - 33 mmol/L    Anion Gap 12.0 7.0 - 16.0    Glucose 134 (H) 65 - 99 mg/dL    Bun 15 8 - 22 mg/dL    Creatinine 0.89 0.50 - 1.40 mg/dL    Calcium 9.6 8.4 - 10.2 mg/dL    AST(SGOT) 17 12 - 45 U/L    ALT(SGPT) 25 2 - 50 U/L    Alkaline Phosphatase 80 30 - 99 U/L    Total Bilirubin 0.6 0.1 - 1.5 mg/dL    Albumin 4.5 3.2 - 4.9 g/dL    Total Protein 7.1 6.0 - 8.2 g/dL    Globulin 2.6 1.9 - 3.5 g/dL    A-G Ratio 1.7 g/dL   TROPONIN   Result Value Ref Range    Troponin T <6 6 - 19 ng/L   HCG QUAL SERUM   Result Value Ref Range    Beta-Hcg Qualitative Serum Negative Negative   ESTIMATED GFR   Result Value Ref Range    GFR (CKD-EPI) 82 >60 mL/min/1.73 m 2   TROPONIN   Result Value Ref Range    Troponin T <6 6 - 19 ng/L   EKG   Result Value Ref Range    Report       [facility].    Test Date:  2022-03-15  Pt Name:    DUYEN MOSES               Department: [facility]  MRN:        [id number]                      Room:  Gender:     Female                       Technician: ANNELISE  :        [id number]                   Requested By:ER TRIAGE PROTOCOL  Order #:    [id number]                    Reading MD: Mateo Cordero MD    Measurements  Intervals                                Axis  Rate:       90                           P:          30  NJ:         55                           QRS:        53  QRSD:       104                          T:           32  QT:         369  QTc:        452    Interpretive Statements  Sinus rhythm  Short IL interval  No ST elevations, no ST depressions, no T wave inversions, normal axis    Electronically Signed On 3- 22:49:51 PDT by Mateo Cordero MD         Radiology:  DX-CHEST-PORTABLE (1 VIEW)   Final Result      1.  No acute cardiac or pulmonary abnormalities are identified.           MDM:  EKG demonstrates no evidence of acute ischemia or arrhythmia.  CBC to evaluate for acute anemia and leukocytosis.  CMP to evaluate for acute electrolyte abnormality, acute kidney injury, acute liver failure or dysfunction.  Troponin to evaluate for ACS as well.  Tylenol given for extremity pain and headache.  Chest x-ray to evaluate for acute cardiopulmonary process such as pneumonia, pulmonary edema, pneumothorax.  hCG to evaluate for pregnancy.  Methimazole given for feelings of anxiety, chills, hyperthyroidism.  Disposition pending work-up.    Electronically signed by: Mateo Cordero M.D., 3/15/2022, 10:49 PM    CMP demonstrates no evidence of acute kidney injury, acute electrolyte abnormality, acute liver failure, CBC demonstrates no evidence of acute anemia or leukocytosis.  Troponin negative x2, ACS is inconsistent with patient presentation at this time.  hCG is negative, IUP versus ectopic are inconsistent with patient presentation at this time.  Chest x-ray demonstrates no acute cardiopulmonary process.  Patient symptoms likely secondary to acute hyperthyroidism secondary to discontinuation of home methimazole.  Patient has been counseled to continue home medications until she has been fully adjusted to propylthiouracil.    Repeat physical exam benign.  I doubt any serious emergency process at this time.  Patient and/or family, friends given strict return precautions and care instructions. They have demonstrated understanding of discharge instructions through teach back mechanism. Advised PCP  follow-up in 1-2 days.  Patient/family/friend expresses understanding and agrees to plan.    This dictation has been created using voice recognition software. I am continuously working with the software to minimize the number of voice recognition errors and I have made every attempt to manually correct the errors within my dictation. However errors  related to this voice recognition software may still exist and should be interpreted within the appropriate context.     Electronically signed by: Mateo Cordero M.D., 3/16/2022 1:28 AM          Disposition:  Home    Final Impression:  1. Hyperthyroidism

## 2022-03-16 NOTE — ED TRIAGE NOTES
Pt comes in c/o chest pressure and L arm pain  Has been having issues w/ chest and L arm discomfort for a while on and off  however today pain worse and L arm pain continues  Having L jaw pain as well    Is having issues with tyroid and medication is being changed  Comes in now due to worried that something might be wrong  Has been having fast HR as well

## 2022-03-18 ENCOUNTER — HOSPITAL ENCOUNTER (OUTPATIENT)
Dept: LAB | Facility: MEDICAL CENTER | Age: 42
End: 2022-03-18
Attending: FAMILY MEDICINE
Payer: COMMERCIAL

## 2022-03-18 LAB
ALBUMIN SERPL BCP-MCNC: 4.7 G/DL (ref 3.2–4.9)
ALBUMIN/GLOB SERPL: 2 G/DL
ALP SERPL-CCNC: 66 U/L (ref 30–99)
ALT SERPL-CCNC: 23 U/L (ref 2–50)
ANION GAP SERPL CALC-SCNC: 12 MMOL/L (ref 7–16)
AST SERPL-CCNC: 17 U/L (ref 12–45)
BILIRUB SERPL-MCNC: 1.2 MG/DL (ref 0.1–1.5)
BUN SERPL-MCNC: 12 MG/DL (ref 8–22)
CALCIUM SERPL-MCNC: 9.2 MG/DL (ref 8.5–10.5)
CHLORIDE SERPL-SCNC: 102 MMOL/L (ref 96–112)
CK SERPL-CCNC: 78 U/L (ref 0–154)
CO2 SERPL-SCNC: 24 MMOL/L (ref 20–33)
CREAT SERPL-MCNC: 0.88 MG/DL (ref 0.5–1.4)
CRP SERPL HS-MCNC: 0.42 MG/DL (ref 0–0.75)
EST. AVERAGE GLUCOSE BLD GHB EST-MCNC: 108 MG/DL
FASTING STATUS PATIENT QL REPORTED: NORMAL
GFR SERPLBLD CREATININE-BSD FMLA CKD-EPI: 84 ML/MIN/1.73 M 2
GLOBULIN SER CALC-MCNC: 2.3 G/DL (ref 1.9–3.5)
GLUCOSE SERPL-MCNC: 91 MG/DL (ref 65–99)
HBA1C MFR BLD: 5.4 % (ref 4–5.6)
POTASSIUM SERPL-SCNC: 4.2 MMOL/L (ref 3.6–5.5)
PROT SERPL-MCNC: 7 G/DL (ref 6–8.2)
SODIUM SERPL-SCNC: 138 MMOL/L (ref 135–145)
TSH SERPL DL<=0.005 MIU/L-ACNC: 1.14 UIU/ML (ref 0.38–5.33)

## 2022-03-18 PROCEDURE — 83036 HEMOGLOBIN GLYCOSYLATED A1C: CPT

## 2022-03-18 PROCEDURE — 86140 C-REACTIVE PROTEIN: CPT

## 2022-03-18 PROCEDURE — 36415 COLL VENOUS BLD VENIPUNCTURE: CPT

## 2022-03-18 PROCEDURE — 82550 ASSAY OF CK (CPK): CPT

## 2022-03-18 PROCEDURE — 84443 ASSAY THYROID STIM HORMONE: CPT

## 2022-03-18 PROCEDURE — 86665 EPSTEIN-BARR CAPSID VCA: CPT

## 2022-03-18 PROCEDURE — 86038 ANTINUCLEAR ANTIBODIES: CPT

## 2022-03-18 PROCEDURE — 85652 RBC SED RATE AUTOMATED: CPT

## 2022-03-18 PROCEDURE — 80053 COMPREHEN METABOLIC PANEL: CPT

## 2022-03-19 LAB
EBV VCA IGG SER IA-ACNC: 286 U/ML (ref 0–21.9)
EBV VCA IGM SER IA-ACNC: <10 U/ML (ref 0–43.9)
ERYTHROCYTE [SEDIMENTATION RATE] IN BLOOD BY WESTERGREN METHOD: 3 MM/HOUR (ref 0–25)

## 2022-03-20 LAB — NUCLEAR IGG SER QL IA: NORMAL

## 2022-04-05 ENCOUNTER — NON-PROVIDER VISIT (OUTPATIENT)
Dept: CARDIOLOGY | Facility: MEDICAL CENTER | Age: 42
End: 2022-04-05
Attending: INTERNAL MEDICINE
Payer: COMMERCIAL

## 2022-04-05 ENCOUNTER — OFFICE VISIT (OUTPATIENT)
Dept: CARDIOLOGY | Facility: MEDICAL CENTER | Age: 42
End: 2022-04-05
Payer: COMMERCIAL

## 2022-04-05 VITALS
WEIGHT: 256 LBS | BODY MASS INDEX: 40.18 KG/M2 | HEART RATE: 112 BPM | SYSTOLIC BLOOD PRESSURE: 102 MMHG | RESPIRATION RATE: 14 BRPM | HEIGHT: 67 IN | OXYGEN SATURATION: 94 % | DIASTOLIC BLOOD PRESSURE: 74 MMHG

## 2022-04-05 DIAGNOSIS — R00.2 PALPITATIONS: ICD-10-CM

## 2022-04-05 DIAGNOSIS — I49.1 APC (ATRIAL PREMATURE CONTRACTIONS): ICD-10-CM

## 2022-04-05 DIAGNOSIS — R07.89 OTHER CHEST PAIN: ICD-10-CM

## 2022-04-05 DIAGNOSIS — I49.3 PVCS (PREMATURE VENTRICULAR CONTRACTIONS): ICD-10-CM

## 2022-04-05 PROCEDURE — 99204 OFFICE O/P NEW MOD 45 MIN: CPT | Performed by: INTERNAL MEDICINE

## 2022-04-05 RX ORDER — PROPRANOLOL HYDROCHLORIDE 20 MG/1
TABLET ORAL
COMMUNITY
End: 2022-06-28

## 2022-04-05 ASSESSMENT — ENCOUNTER SYMPTOMS
PALPITATIONS: 0
COUGH: 0
WEAKNESS: 0
BLURRED VISION: 0
FEVER: 0
FOCAL WEAKNESS: 0
MUSCULOSKELETAL NEGATIVE: 1
WEIGHT LOSS: 1
EYES NEGATIVE: 1
BRUISES/BLEEDS EASILY: 0
DOUBLE VISION: 0
DEPRESSION: 0
MYALGIAS: 0
GASTROINTESTINAL NEGATIVE: 1
VOMITING: 0
ABDOMINAL PAIN: 0
NEUROLOGICAL NEGATIVE: 1
HEADACHES: 0
CHILLS: 0
SHORTNESS OF BREATH: 0
NERVOUS/ANXIOUS: 1
CLAUDICATION: 0
NAUSEA: 0
RESPIRATORY NEGATIVE: 1
DIZZINESS: 0

## 2022-04-05 ASSESSMENT — FIBROSIS 4 INDEX: FIB4 SCORE: 0.64

## 2022-04-05 NOTE — PROGRESS NOTES
Chief Complaint   Patient presents with   • Chest Pain       Subjective     Lauryn Aguillon is a 42 y.o. female who presents today as a consult from Mateo Barragan for chest pain and palpitations.    Thank you for allowing me to evaluate Ms. Aguillon, who as you know is a 42 year old female with hyperthyroidism, family history of coronary artery disease. She was recently evaluated at [facility] on 03/15/22 for chest pain. Her pain has improved. She has frequent palpitation. Her pain is aggravated by exertion, emotional stress and is alleviated by rest. She suffers with significant anxiety.    Past Medical History:   Diagnosis Date   • Graves disease    • Hyperthyroidism      Past Surgical History:   Procedure Laterality Date   • OTHER ORTHOPEDIC SURGERY      left knee, left hip     Family History   Problem Relation Age of Onset   • Heart Attack Neg Hx      Social History     Socioeconomic History   • Marital status:      Spouse name: Not on file   • Number of children: Not on file   • Years of education: Not on file   • Highest education level: Not on file   Occupational History   • Not on file   Tobacco Use   • Smoking status: Former Smoker   • Smokeless tobacco: Never Used   Vaping Use   • Vaping Use: Every day   • Devices: Pre-filled or refillable cartridge   Substance and Sexual Activity   • Alcohol use: Never     Alcohol/week: 3.0 oz     Types: 5 Glasses of wine per week   • Drug use: No   • Sexual activity: Yes     Partners: Male   Other Topics Concern   • Not on file   Social History Narrative   • Not on file     Social Determinants of Health     Financial Resource Strain: Not on file   Food Insecurity: Not on file   Transportation Needs: Not on file   Physical Activity: Not on file   Stress: Not on file   Social Connections: Not on file   Intimate Partner Violence: Not on file   Housing Stability: Not on file     No Known Allergies     (Medications  reviewed.)  Outpatient Encounter Medications as of 4/5/2022   Medication Sig Dispense Refill   • propranolol (INDERAL) 20 MG Tab 1-2 tab     • acetaminophen (TYLENOL) 500 MG Tab Take 2 Tabs by mouth every 6 hours as needed (Mild Pain; (Pain scale 1-3); Temp greater than 100.5 F). 30 Tab 0   • methimazole (TAPAZOLE) 5 MG Tab Take 2 Tabs by mouth 3 times a day. (Patient taking differently: Take 5 mg by mouth every day.) 90 Tab 3   • Multiple Vitamins-Minerals (CENTRUM SILVER ULTRA WOMENS PO) Take  by mouth.     • ALPRAZolam (XANAX) 0.5 MG Tab Take 0.5 mg by mouth at bedtime as needed for Sleep.     • cyclobenzaprine (FLEXERIL) 10 MG Tab Take 1 Tab by mouth 3 times a day as needed. 30 Tab 0   • [DISCONTINUED] ALPRAZOLAM PO  (Patient not taking: Reported on 4/5/2022)     • [DISCONTINUED] CYCLOBENZAPRINE HCL PO  (Patient not taking: Reported on 4/5/2022)     • [DISCONTINUED] propylthiouracil (PTU) 50 MG Tab Take 50 mg by mouth every day. As of 3/15/2022 pt has not started medication  possible starting tomorrow or next day (Patient not taking: Reported on 4/5/2022)     • [DISCONTINUED] azithromycin (ZITHROMAX) 1 GM powder Take 1 Packet by mouth one time. (Patient not taking: No sig reported)     • [DISCONTINUED] dexamethasone (DECADRON) 6 MG Tab Take 6 mg by mouth every day. (Patient not taking: No sig reported)     • [DISCONTINUED] propranolol (INDERAL) 20 MG Tab Take 1 Tab by mouth every 8 hours. (Patient not taking: Reported on 4/5/2022) 90 Tab 11   • [DISCONTINUED] Multiple Vitamins-Minerals (ZINC PO) Take  by mouth. (Patient not taking: No sig reported)       No facility-administered encounter medications on file as of 4/5/2022.     Review of Systems   Constitutional: Positive for weight loss. Negative for chills, fever and malaise/fatigue.   HENT: Negative.  Negative for hearing loss.    Eyes: Negative.  Negative for blurred vision and double vision.   Respiratory: Negative.  Negative for cough and shortness of  "breath.    Cardiovascular: Positive for chest pain. Negative for palpitations, claudication and leg swelling.   Gastrointestinal: Negative.  Negative for abdominal pain, nausea and vomiting.   Genitourinary: Negative.  Negative for dysuria and urgency.   Musculoskeletal: Negative.  Negative for joint pain and myalgias.   Skin: Negative.  Negative for itching and rash.   Neurological: Negative.  Negative for dizziness, focal weakness, weakness and headaches.   Endo/Heme/Allergies: Negative.  Does not bruise/bleed easily.   Psychiatric/Behavioral: Negative for depression. The patient is nervous/anxious.               Objective     /74 (BP Location: Left arm, Patient Position: Sitting, BP Cuff Size: Adult)   Pulse (!) 112   Resp 14   Ht 1.702 m (5' 7\")   Wt 116 kg (256 lb)   SpO2 94%   BMI 40.10 kg/m²     Physical Exam  Constitutional:       Appearance: She is well-developed.   HENT:      Head: Normocephalic and atraumatic.   Neck:      Vascular: No JVD.   Cardiovascular:      Rate and Rhythm: Normal rate and regular rhythm.      Heart sounds: Normal heart sounds.   Pulmonary:      Effort: Pulmonary effort is normal.      Breath sounds: Normal breath sounds.   Abdominal:      General: Bowel sounds are normal.      Palpations: Abdomen is soft.      Comments: No hepatosplenomegaly.   Musculoskeletal:         General: Normal range of motion.   Lymphadenopathy:      Cervical: No cervical adenopathy.   Skin:     General: Skin is warm and dry.   Neurological:      Mental Status: She is alert and oriented to person, place, and time.            CARDIAC STUDIES/PROCEDURES:    ECHOCARDIOGRAM CONCLUSIONS (11/20/20)  No prior study is available for comparison.   Normal left ventricular systolic function.  Left ventricular ejection fraction is visually estimated to be 60%.  No significant valve abnormalities.   Estimated right ventricular systolic pressure is 30 mmHg.  (study result reviewed)    EKG performed on " (03/15/22) was reviewed: EKG personally interpreted shows sinus rhythm.    Laboratory results of (03/18/21) were reviewed. TSH levels of 1.14 uIU/ml noted.    Assessment & Plan     1. Other chest pain     2. Palpitations         Medical Decision Making: Today's Assessment/Status/Plan:        1. Chest pain: She is experiencing chest pain as described above. We will perform an exercise treadmill test and follow up with her.   2. Palpitations: She is experiencing palpitations as described above which does improve her symptoms.. We will perform a Zio Patch monitor and an echocardiogram follow up with her.  3. Health maintenance: She will work on vape reduction.     We will follow up after his tests.    CC Carlos Duran

## 2022-04-05 NOTE — PROGRESS NOTES
Home enrollment completed in the 14 day Zio XT Holter monitoring program, per Petey Oscar M.D.  Monitor will be shipped to patient by [employer].     >Pending EOS.

## 2022-04-07 ENCOUNTER — HOSPITAL ENCOUNTER (OUTPATIENT)
Dept: LAB | Facility: MEDICAL CENTER | Age: 42
End: 2022-04-07
Attending: INTERNAL MEDICINE
Payer: COMMERCIAL

## 2022-04-07 PROCEDURE — 84443 ASSAY THYROID STIM HORMONE: CPT

## 2022-04-07 PROCEDURE — 36415 COLL VENOUS BLD VENIPUNCTURE: CPT

## 2022-04-07 PROCEDURE — 84439 ASSAY OF FREE THYROXINE: CPT

## 2022-04-08 LAB
T4 FREE SERPL-MCNC: 1.18 NG/DL (ref 0.93–1.7)
TSH SERPL DL<=0.005 MIU/L-ACNC: 1.75 UIU/ML (ref 0.38–5.33)

## 2022-04-18 ENCOUNTER — TELEPHONE (OUTPATIENT)
Dept: CARDIOLOGY | Facility: MEDICAL CENTER | Age: 42
End: 2022-04-18
Payer: COMMERCIAL

## 2022-04-18 NOTE — TELEPHONE ENCOUNTER
Hi,    This patient called and stated her monitor was irritating her skin and hadn't pushed the button in 5 days. Would like someone to contact her back to see if she needs to continue wearing.    Ph. [phone]    Thank you,    JUSTUS

## 2022-04-22 ENCOUNTER — TELEPHONE (OUTPATIENT)
Dept: CARDIOLOGY | Facility: MEDICAL CENTER | Age: 42
End: 2022-04-22
Payer: COMMERCIAL

## 2022-04-22 PROCEDURE — 93248 EXT ECG>7D<15D REV&INTERPJ: CPT | Performed by: INTERNAL MEDICINE

## 2022-04-22 NOTE — TELEPHONE ENCOUNTER
----- Message from Petey Oscar M.D. sent at 4/22/2022 12:26 PM PDT -----  Please call with Zio Patch showing predominant sinus rhythm with rare premature atrial contractions and premature ventricular contractions only, ordered for palpitations.    Geovanna.  GABBIE

## 2022-06-13 ENCOUNTER — HOSPITAL ENCOUNTER (OUTPATIENT)
Dept: LAB | Facility: MEDICAL CENTER | Age: 42
End: 2022-06-13
Attending: INTERNAL MEDICINE
Payer: COMMERCIAL

## 2022-06-13 LAB
T4 FREE SERPL-MCNC: 1.15 NG/DL (ref 0.93–1.7)
TSH SERPL DL<=0.005 MIU/L-ACNC: 2.5 UIU/ML (ref 0.38–5.33)

## 2022-06-13 PROCEDURE — 84443 ASSAY THYROID STIM HORMONE: CPT

## 2022-06-13 PROCEDURE — 84439 ASSAY OF FREE THYROXINE: CPT

## 2022-06-13 PROCEDURE — 36415 COLL VENOUS BLD VENIPUNCTURE: CPT

## 2022-06-28 ENCOUNTER — OFFICE VISIT (OUTPATIENT)
Dept: CARDIOLOGY | Facility: MEDICAL CENTER | Age: 42
End: 2022-06-28
Payer: COMMERCIAL

## 2022-06-28 VITALS
SYSTOLIC BLOOD PRESSURE: 122 MMHG | OXYGEN SATURATION: 94 % | HEART RATE: 84 BPM | RESPIRATION RATE: 16 BRPM | WEIGHT: 253 LBS | BODY MASS INDEX: 39.71 KG/M2 | HEIGHT: 67 IN | DIASTOLIC BLOOD PRESSURE: 80 MMHG

## 2022-06-28 DIAGNOSIS — I49.3 PREMATURE VENTRICULAR CONTRACTIONS (PVCS) (VPCS): ICD-10-CM

## 2022-06-28 DIAGNOSIS — R00.2 PALPITATIONS: ICD-10-CM

## 2022-06-28 DIAGNOSIS — I49.1 PREMATURE ATRIAL CONTRACTION: ICD-10-CM

## 2022-06-28 PROBLEM — R07.89 OTHER CHEST PAIN: Status: RESOLVED | Noted: 2022-04-05 | Resolved: 2022-06-28

## 2022-06-28 PROCEDURE — 99214 OFFICE O/P EST MOD 30 MIN: CPT | Performed by: INTERNAL MEDICINE

## 2022-06-28 RX ORDER — PROPRANOLOL HYDROCHLORIDE 10 MG/1
10 TABLET ORAL 2 TIMES DAILY
Qty: 180 TABLET | Refills: 3 | Status: SHIPPED | OUTPATIENT
Start: 2022-06-28 | End: 2023-05-02

## 2022-06-28 ASSESSMENT — ENCOUNTER SYMPTOMS
RESPIRATORY NEGATIVE: 1
PSYCHIATRIC NEGATIVE: 1
FEVER: 0
SHORTNESS OF BREATH: 0
COUGH: 0
NERVOUS/ANXIOUS: 0
PALPITATIONS: 1
VOMITING: 0
DEPRESSION: 0
WEAKNESS: 0
BRUISES/BLEEDS EASILY: 0
WEIGHT LOSS: 1
EYES NEGATIVE: 1
GASTROINTESTINAL NEGATIVE: 1
BLURRED VISION: 0
DOUBLE VISION: 0
CHILLS: 0
TREMORS: 1
NAUSEA: 0
CLAUDICATION: 0
DIZZINESS: 1
MYALGIAS: 0
ABDOMINAL PAIN: 0
MUSCULOSKELETAL NEGATIVE: 1
HEADACHES: 0
FOCAL WEAKNESS: 0

## 2022-06-28 ASSESSMENT — FIBROSIS 4 INDEX: FIB4 SCORE: 0.64

## 2022-06-28 NOTE — PROGRESS NOTES
Chief Complaint   Patient presents with   • Chest Pain     F/V Dx: Other chest pain   • Hyperthyroidism              Subjective     Lauryn Aguillon is a 42 y.o. female who presents today for follow up of chest pain, palpitations, study result review.    Since the patient's last visit on 04/05/22, she has been doing well clinically. Her chest pain has resolved, however, her palpitations has increased with her thyroid medication adjustment. She denies chest pain, shortness of breath, palpitations, nausea/vomiting or diaphoresis. She has lost 14 pounds with diet modification and exercise. She has been diagnosed with sleep apnea and is treated with C-Pap. Her stress level has reduced.     Past Medical History:   Diagnosis Date   • Graves disease    • Hyperthyroidism    • Palpitations      Past Surgical History:   Procedure Laterality Date   • OTHER ORTHOPEDIC SURGERY      left knee, left hip     Family History   Problem Relation Age of Onset   • Heart Attack Father      Social History     Socioeconomic History   • Marital status:      Spouse name: Not on file   • Number of children: Not on file   • Years of education: Not on file   • Highest education level: Not on file   Occupational History   • Not on file   Tobacco Use   • Smoking status: Current Some Day Smoker   • Smokeless tobacco: Never Used   • Tobacco comment: vape some day   Vaping Use   • Vaping Use: Every day   • Devices: Pre-filled or refillable cartridge   Substance and Sexual Activity   • Alcohol use: Never     Alcohol/week: 3.0 oz     Types: 5 Glasses of wine per week   • Drug use: No   • Sexual activity: Yes     Partners: Male   Other Topics Concern   • Not on file   Social History Narrative   • Not on file     Social Determinants of Health     Financial Resource Strain: Not on file   Food Insecurity: Not on file   Transportation Needs: Not on file   Physical Activity: Not on file   Stress: Not on file   Social Connections: Not on file    Intimate Partner Violence: Not on file   Housing Stability: Not on file     No Known Allergies     (Medications reviewed.)  Outpatient Encounter Medications as of 6/28/2022   Medication Sig Dispense Refill   • propranolol (INDERAL) 10 MG Tab Take 1 Tablet by mouth 2 times a day. 180 Tablet 3   • acetaminophen (TYLENOL) 500 MG Tab Take 2 Tabs by mouth every 6 hours as needed (Mild Pain; (Pain scale 1-3); Temp greater than 100.5 F). 30 Tab 0   • methimazole (TAPAZOLE) 5 MG Tab Take 2 Tabs by mouth 3 times a day. (Patient taking differently: Take 2.5 mg by mouth every day.) 90 Tab 3   • Multiple Vitamins-Minerals (CENTRUM SILVER ULTRA WOMENS PO) Take  by mouth.     • ALPRAZolam (XANAX) 0.5 MG Tab Take 0.5 mg by mouth at bedtime as needed for Sleep.     • cyclobenzaprine (FLEXERIL) 10 MG Tab Take 1 Tab by mouth 3 times a day as needed. 30 Tab 0   • [DISCONTINUED] propranolol (INDERAL) 20 MG Tab 1-2 tab       No facility-administered encounter medications on file as of 6/28/2022.     Review of Systems   Constitutional: Positive for weight loss. Negative for chills, fever and malaise/fatigue.   HENT: Negative.  Negative for hearing loss.    Eyes: Negative.  Negative for blurred vision and double vision.   Respiratory: Negative.  Negative for cough and shortness of breath.    Cardiovascular: Positive for palpitations. Negative for chest pain, claudication and leg swelling.   Gastrointestinal: Negative.  Negative for abdominal pain, nausea and vomiting.   Genitourinary: Negative.  Negative for dysuria and urgency.   Musculoskeletal: Negative.  Negative for joint pain and myalgias.   Skin: Negative.  Negative for itching and rash.   Neurological: Positive for dizziness and tremors. Negative for focal weakness, weakness and headaches.   Endo/Heme/Allergies: Negative.  Does not bruise/bleed easily.   Psychiatric/Behavioral: Negative.  Negative for depression. The patient is not nervous/anxious.               Objective     BP  "122/80 (BP Location: Left arm, Patient Position: Sitting, BP Cuff Size: Adult)   Pulse 84   Resp 16   Ht 1.702 m (5' 7\")   Wt 115 kg (253 lb)   SpO2 94%   BMI 39.63 kg/m²     Physical Exam  Constitutional:       Appearance: She is well-developed.   HENT:      Head: Normocephalic and atraumatic.   Neck:      Vascular: No JVD.   Cardiovascular:      Rate and Rhythm: Normal rate and regular rhythm.      Heart sounds: Normal heart sounds.   Pulmonary:      Effort: Pulmonary effort is normal.      Breath sounds: Normal breath sounds.   Abdominal:      General: Bowel sounds are normal.      Palpations: Abdomen is soft.      Comments: No hepatosplenomegaly.   Musculoskeletal:         General: Normal range of motion.   Lymphadenopathy:      Cervical: No cervical adenopathy.   Skin:     General: Skin is warm and dry.   Neurological:      Mental Status: She is alert and oriented to person, place, and time.            CARDIAC STUDIES/PROCEDURES:    EXERCISE TREADMILL TEST [facility] (05/18/22)  Full Rene: 9:16   THR acheived   No chest pain   No EKG changes diag of ischemia   Neg stress test for ischemia.   (study result reviewed)     ECHOCARDIOGRAM CONCLUSIONS (11/20/20)  No prior study is available for comparison.   Normal left ventricular systolic function.  Left ventricular ejection fraction is visually estimated to be 60%.  No significant valve abnormalities.   Estimated right ventricular systolic pressure is 30 mmHg.     EKG performed on (03/15/22) EKG shows sinus rhythm.     Laboratory results of (03/18/21) TSH levels of 1.14 uIU/ml noted.     ZIO PATCH REPORT (04/07/22-04/18/22)   Zio Patch study showing predominately sinus rhythm with maximum rate of 153 bpm, minimum rate of 48 bpm, average of 85 bpm.   Atrial fibrillation: None.   Supraventricular tachycardia: None.   Pauses: None.   Heart block: None.   Ventricular tachycardia: None.   <1% burden of premature ventricular contractions and <1% burden " of premature atrial contractions.     Assessment & Plan     1. Palpitations     2. Premature atrial contraction     3. Premature ventricular contractions (PVCs) (VPCs)         Medical Decision Making: Today's Assessment/Status/Plan:        1. Palpitations, premature atrial contractions, premature ventricular contractions: She is doing well with improvement of her palpitations. We will continue with current medical therapy including palpitations.  2. Health maintenance: She will work on vape reduction.      We will follow up after his tests.     CC Carlos Duran

## 2022-08-15 ENCOUNTER — HOSPITAL ENCOUNTER (OUTPATIENT)
Dept: LAB | Facility: MEDICAL CENTER | Age: 42
End: 2022-08-15
Attending: INTERNAL MEDICINE
Payer: COMMERCIAL

## 2022-08-15 LAB
T4 FREE SERPL-MCNC: 1.11 NG/DL (ref 0.93–1.7)
TSH SERPL DL<=0.005 MIU/L-ACNC: 2.66 UIU/ML (ref 0.38–5.33)

## 2022-08-15 PROCEDURE — 84443 ASSAY THYROID STIM HORMONE: CPT

## 2022-08-15 PROCEDURE — 36415 COLL VENOUS BLD VENIPUNCTURE: CPT

## 2022-08-15 PROCEDURE — 84439 ASSAY OF FREE THYROXINE: CPT

## 2022-11-18 ENCOUNTER — HOSPITAL ENCOUNTER (OUTPATIENT)
Dept: LAB | Facility: MEDICAL CENTER | Age: 42
End: 2022-11-18
Attending: INTERNAL MEDICINE
Payer: COMMERCIAL

## 2022-11-18 LAB
T4 FREE SERPL-MCNC: 1.09 NG/DL (ref 0.93–1.7)
TSH SERPL DL<=0.005 MIU/L-ACNC: 2.06 UIU/ML (ref 0.38–5.33)

## 2022-11-18 PROCEDURE — 84439 ASSAY OF FREE THYROXINE: CPT

## 2022-11-18 PROCEDURE — 84443 ASSAY THYROID STIM HORMONE: CPT

## 2022-11-18 PROCEDURE — 36415 COLL VENOUS BLD VENIPUNCTURE: CPT

## 2023-01-25 ENCOUNTER — HOSPITAL ENCOUNTER (OUTPATIENT)
Dept: LAB | Facility: MEDICAL CENTER | Age: 43
End: 2023-01-25
Attending: INTERNAL MEDICINE
Payer: COMMERCIAL

## 2023-01-25 LAB
T4 FREE SERPL-MCNC: 1.14 NG/DL (ref 0.93–1.7)
TSH SERPL DL<=0.005 MIU/L-ACNC: 1.96 UIU/ML (ref 0.38–5.33)

## 2023-01-25 PROCEDURE — 84443 ASSAY THYROID STIM HORMONE: CPT

## 2023-01-25 PROCEDURE — 36415 COLL VENOUS BLD VENIPUNCTURE: CPT

## 2023-01-25 PROCEDURE — 84439 ASSAY OF FREE THYROXINE: CPT

## 2023-01-25 PROCEDURE — 83520 IMMUNOASSAY QUANT NOS NONAB: CPT

## 2023-01-25 PROCEDURE — 84445 ASSAY OF TSI GLOBULIN: CPT

## 2023-01-25 PROCEDURE — 84481 FREE ASSAY (FT-3): CPT

## 2023-01-27 LAB
T3FREE SERPL-MCNC: 3 PG/ML (ref 2–4.4)
TSH RECEP AB SER-ACNC: 0.98 IU/L

## 2023-01-28 LAB — TSI SER-ACNC: <0.1 IU/L

## 2023-01-31 ENCOUNTER — OFFICE VISIT (OUTPATIENT)
Dept: CARDIOLOGY | Facility: MEDICAL CENTER | Age: 43
End: 2023-01-31
Payer: COMMERCIAL

## 2023-01-31 VITALS
HEART RATE: 86 BPM | DIASTOLIC BLOOD PRESSURE: 90 MMHG | BODY MASS INDEX: 40.81 KG/M2 | RESPIRATION RATE: 16 BRPM | WEIGHT: 260 LBS | SYSTOLIC BLOOD PRESSURE: 110 MMHG | OXYGEN SATURATION: 96 % | HEIGHT: 67 IN

## 2023-01-31 DIAGNOSIS — F17.200 NICOTINE DEPENDENCE DUE TO VAPING NON-TOBACCO PRODUCT: ICD-10-CM

## 2023-01-31 DIAGNOSIS — I49.3 PREMATURE VENTRICULAR CONTRACTIONS (PVCS) (VPCS): ICD-10-CM

## 2023-01-31 DIAGNOSIS — I49.1 PREMATURE ATRIAL CONTRACTION: ICD-10-CM

## 2023-01-31 DIAGNOSIS — R00.2 PALPITATIONS: ICD-10-CM

## 2023-01-31 PROCEDURE — 99214 OFFICE O/P EST MOD 30 MIN: CPT | Mod: 25 | Performed by: INTERNAL MEDICINE

## 2023-01-31 PROCEDURE — 99406 BEHAV CHNG SMOKING 3-10 MIN: CPT | Performed by: INTERNAL MEDICINE

## 2023-01-31 RX ORDER — HYDROXYZINE HYDROCHLORIDE 25 MG/1
25 TABLET, FILM COATED ORAL EVERY 6 HOURS PRN
COMMUNITY
Start: 2023-01-30

## 2023-01-31 ASSESSMENT — ENCOUNTER SYMPTOMS
SHORTNESS OF BREATH: 0
HEADACHES: 0
MYALGIAS: 0
VOMITING: 0
NEUROLOGICAL NEGATIVE: 1
FEVER: 0
DOUBLE VISION: 0
PALPITATIONS: 1
EYES NEGATIVE: 1
RESPIRATORY NEGATIVE: 1
BRUISES/BLEEDS EASILY: 0
COUGH: 0
WEIGHT LOSS: 0
CLAUDICATION: 0
BLURRED VISION: 0
FOCAL WEAKNESS: 0
ABDOMINAL PAIN: 0
MUSCULOSKELETAL NEGATIVE: 1
DEPRESSION: 0
WEAKNESS: 0
PSYCHIATRIC NEGATIVE: 1
CONSTITUTIONAL NEGATIVE: 1
CHILLS: 0
DIZZINESS: 0
NERVOUS/ANXIOUS: 0
NAUSEA: 0
GASTROINTESTINAL NEGATIVE: 1

## 2023-01-31 ASSESSMENT — FIBROSIS 4 INDEX: FIB4 SCORE: 0.64

## 2023-01-31 NOTE — PROGRESS NOTES
No chief complaint on file.      Subjective     Lauryn Aguillon is a 42 y.o. female who presents today for follow up of palpitations.    Since the patient's last visit on 06/28/22, she has been doing well clinically after starting atarax with reduction of stress, palpitations. She denies chest pain, shortness of breath, palpitations, nausea/vomiting or diaphoresis. She was under stress with her daughter's situation.     Past Medical History:   Diagnosis Date    Graves disease     Hyperthyroidism     Palpitations      Past Surgical History:   Procedure Laterality Date    OTHER ORTHOPEDIC SURGERY      left knee, left hip     Family History   Problem Relation Age of Onset    Heart Attack Father      Social History     Socioeconomic History    Marital status:      Spouse name: Not on file    Number of children: Not on file    Years of education: Not on file    Highest education level: Not on file   Occupational History    Not on file   Tobacco Use    Smoking status: Some Days    Smokeless tobacco: Never    Tobacco comments:     vape some day   Vaping Use    Vaping Use: Every day    Devices: Pre-filled or refillable cartridge   Substance and Sexual Activity    Alcohol use: Never     Alcohol/week: 3.0 oz     Types: 5 Glasses of wine per week    Drug use: No    Sexual activity: Yes     Partners: Male   Other Topics Concern    Not on file   Social History Narrative    Not on file     Social Determinants of Health     Financial Resource Strain: Not on file   Food Insecurity: Not on file   Transportation Needs: Not on file   Physical Activity: Not on file   Stress: Not on file   Social Connections: Not on file   Intimate Partner Violence: Not on file   Housing Stability: Not on file     No Known Allergies    (Medications reviewed.)  Outpatient Encounter Medications as of 1/31/2023   Medication Sig Dispense Refill    hydrOXYzine HCl (ATARAX) 25 MG Tab Take 25 mg by mouth every 6 hours as needed.      propranolol  "(INDERAL) 10 MG Tab Take 1 Tablet by mouth 2 times a day. 180 Tablet 3    methimazole (TAPAZOLE) 5 MG Tab Take 2 Tabs by mouth 3 times a day. (Patient taking differently: Take 2.5 mg by mouth every day.) 90 Tab 3    Multiple Vitamins-Minerals (CENTRUM SILVER ULTRA WOMENS PO) Take  by mouth.      ALPRAZolam (XANAX) 0.5 MG Tab Take 0.5 mg by mouth at bedtime as needed for Sleep.      cyclobenzaprine (FLEXERIL) 10 MG Tab Take 1 Tab by mouth 3 times a day as needed. 30 Tab 0    sertraline (ZOLOFT) 50 MG Tab  (Patient not taking: Reported on 1/31/2023)      [DISCONTINUED] acetaminophen (TYLENOL) 500 MG Tab Take 2 Tabs by mouth every 6 hours as needed (Mild Pain; (Pain scale 1-3); Temp greater than 100.5 F). 30 Tab 0     No facility-administered encounter medications on file as of 1/31/2023.     Review of Systems   Constitutional: Negative.  Negative for chills, fever, malaise/fatigue and weight loss.   HENT: Negative.  Negative for hearing loss.    Eyes: Negative.  Negative for blurred vision and double vision.   Respiratory: Negative.  Negative for cough and shortness of breath.    Cardiovascular:  Positive for palpitations. Negative for chest pain, claudication and leg swelling.   Gastrointestinal: Negative.  Negative for abdominal pain, nausea and vomiting.   Genitourinary: Negative.  Negative for dysuria and urgency.   Musculoskeletal: Negative.  Negative for joint pain and myalgias.   Skin: Negative.  Negative for itching and rash.   Neurological: Negative.  Negative for dizziness, focal weakness, weakness and headaches.   Endo/Heme/Allergies: Negative.  Does not bruise/bleed easily.   Psychiatric/Behavioral: Negative.  Negative for depression. The patient is not nervous/anxious.             Objective     BP (!) 110/90 (BP Location: Left arm, Patient Position: Sitting, BP Cuff Size: Adult)   Pulse 86   Resp 16   Ht 1.702 m (5' 7\")   Wt 118 kg (260 lb)   SpO2 96%   BMI 40.72 kg/m²     Physical " Exam  Constitutional:       Appearance: Normal appearance. She is well-developed and normal weight.   HENT:      Head: Normocephalic and atraumatic.   Neck:      Vascular: No JVD.   Cardiovascular:      Rate and Rhythm: Normal rate and regular rhythm.      Heart sounds: Normal heart sounds.   Pulmonary:      Effort: Pulmonary effort is normal.      Breath sounds: Normal breath sounds.   Abdominal:      General: Bowel sounds are normal.      Palpations: Abdomen is soft.      Comments: No hepatosplenomegaly.   Musculoskeletal:         General: Normal range of motion.   Lymphadenopathy:      Cervical: No cervical adenopathy.   Skin:     General: Skin is warm and dry.   Neurological:      Mental Status: She is alert and oriented to person, place, and time.          CARDIAC STUDIES/PROCEDURES:     EXERCISE TREADMILL TEST [facility] (05/18/22)  Full Rene: 9:16   THR acheived   No chest pain   No EKG changes diag of ischemia   Neg stress test for ischemia.      ECHOCARDIOGRAM CONCLUSIONS (11/20/20)  No prior study is available for comparison.   Normal left ventricular systolic function.  Left ventricular ejection fraction is visually estimated to be 60%.  No significant valve abnormalities.   Estimated right ventricular systolic pressure is 30 mmHg.     EKG performed on (03/15/22) EKG shows sinus rhythm.     Laboratory results of (03/18/21) TSH levels of 1.14 uIU/ml noted.     ZIO PATCH REPORT (04/07/22-04/18/22)   Zio Patch study showing predominately sinus rhythm with maximum rate of 153 bpm, minimum rate of 48 bpm, average of 85 bpm.   Atrial fibrillation: None.   Supraventricular tachycardia: None.   Pauses: None.   Heart block: None.   Ventricular tachycardia: None.   <1% burden of premature ventricular contractions and <1% burden of premature atrial contractions.      Assessment & Plan     1. Palpitations        2. Premature atrial contraction        3. Premature ventricular contractions (PVCs) (VPCs)         4. Nicotine dependence due to vaping non-tobacco product            Medical Decision Making: Today's Assessment/Status/Plan:        Palpitations, premature atrial contractions, premature ventricular contractions: She is doing well without recurrence of her palpitations with treatment on anxiety.   Health maintenance: She will work on vape reduction.      We will follow up the patient in one year.     CC Carlos Duran

## 2023-03-07 ENCOUNTER — HOSPITAL ENCOUNTER (OUTPATIENT)
Dept: LAB | Facility: MEDICAL CENTER | Age: 43
End: 2023-03-07
Attending: INTERNAL MEDICINE
Payer: COMMERCIAL

## 2023-03-07 PROCEDURE — 36415 COLL VENOUS BLD VENIPUNCTURE: CPT

## 2023-03-07 PROCEDURE — 84439 ASSAY OF FREE THYROXINE: CPT

## 2023-03-07 PROCEDURE — 84443 ASSAY THYROID STIM HORMONE: CPT

## 2023-03-07 PROCEDURE — 84481 FREE ASSAY (FT-3): CPT

## 2023-03-08 LAB
T3FREE SERPL-MCNC: 3.02 PG/ML (ref 2–4.4)
T4 FREE SERPL-MCNC: 1.07 NG/DL (ref 0.93–1.7)
TSH SERPL DL<=0.005 MIU/L-ACNC: 2 UIU/ML (ref 0.38–5.33)

## 2023-05-01 NOTE — TELEPHONE ENCOUNTER
Is the patient due for a refill? Yes    Was the patient seen the past year? Yes    Date of last office visit: 1/31/2023    Does the patient have an upcoming appointment?  No   If yes, When?     Provider to refill:[facility]    Does the patients insurance require a 100 day supply?  No

## 2023-05-02 RX ORDER — PROPRANOLOL HYDROCHLORIDE 10 MG/1
TABLET ORAL
Qty: 180 TABLET | Refills: 3 | Status: SHIPPED | OUTPATIENT
Start: 2023-05-02

## 2023-05-12 ENCOUNTER — HOSPITAL ENCOUNTER (OUTPATIENT)
Dept: LAB | Facility: MEDICAL CENTER | Age: 43
End: 2023-05-12
Attending: PHYSICIAN ASSISTANT
Payer: COMMERCIAL

## 2023-05-12 LAB
T4 FREE SERPL-MCNC: 1.16 NG/DL (ref 0.93–1.7)
TSH SERPL DL<=0.005 MIU/L-ACNC: 1.86 UIU/ML (ref 0.38–5.33)

## 2023-05-12 PROCEDURE — 84439 ASSAY OF FREE THYROXINE: CPT

## 2023-05-12 PROCEDURE — 84443 ASSAY THYROID STIM HORMONE: CPT

## 2023-05-12 PROCEDURE — 36415 COLL VENOUS BLD VENIPUNCTURE: CPT

## 2023-06-13 ENCOUNTER — HOSPITAL ENCOUNTER (OUTPATIENT)
Dept: LAB | Facility: MEDICAL CENTER | Age: 43
End: 2023-06-13
Attending: INTERNAL MEDICINE
Payer: COMMERCIAL

## 2023-06-13 LAB
T4 FREE SERPL-MCNC: 1.18 NG/DL (ref 0.93–1.7)
TSH SERPL DL<=0.005 MIU/L-ACNC: 1.21 UIU/ML (ref 0.38–5.33)

## 2023-06-13 PROCEDURE — 84445 ASSAY OF TSI GLOBULIN: CPT

## 2023-06-13 PROCEDURE — 36415 COLL VENOUS BLD VENIPUNCTURE: CPT

## 2023-06-13 PROCEDURE — 84439 ASSAY OF FREE THYROXINE: CPT

## 2023-06-13 PROCEDURE — 83520 IMMUNOASSAY QUANT NOS NONAB: CPT

## 2023-06-13 PROCEDURE — 84443 ASSAY THYROID STIM HORMONE: CPT

## 2023-06-15 LAB — TSH RECEP AB SER-ACNC: 0.84 IU/L

## 2023-06-16 LAB — TSI SER-ACNC: <0.1 IU/L

## 2024-02-29 ENCOUNTER — HOSPITAL ENCOUNTER (OUTPATIENT)
Dept: RADIOLOGY | Facility: MEDICAL CENTER | Age: 44
End: 2024-02-29
Attending: PHYSICIAN ASSISTANT
Payer: COMMERCIAL

## 2024-02-29 DIAGNOSIS — M25.561 PAIN AND SWELLING OF KNEE, RIGHT: ICD-10-CM

## 2024-02-29 DIAGNOSIS — M25.561 ACUTE PAIN OF RIGHT KNEE: ICD-10-CM

## 2024-02-29 DIAGNOSIS — M25.461 PAIN AND SWELLING OF KNEE, RIGHT: ICD-10-CM

## 2024-02-29 PROCEDURE — 93971 EXTREMITY STUDY: CPT

## 2024-03-01 NOTE — RESULT ENCOUNTER NOTE
Left voicemail stating no DVT.  She is invited to call me as I want to discuss the rest of her findings.

## 2025-01-13 ENCOUNTER — HOSPITAL ENCOUNTER (OUTPATIENT)
Dept: RADIOLOGY | Facility: MEDICAL CENTER | Age: 45
End: 2025-01-13
Attending: FAMILY MEDICINE
Payer: COMMERCIAL

## 2025-01-13 DIAGNOSIS — R22.2 MASS OF CHEST WALL: ICD-10-CM

## 2025-01-13 PROCEDURE — G0279 TOMOSYNTHESIS, MAMMO: HCPCS

## 2025-01-13 PROCEDURE — 76642 ULTRASOUND BREAST LIMITED: CPT | Mod: RT

## 2025-01-30 ENCOUNTER — APPOINTMENT (OUTPATIENT)
Dept: RADIOLOGY | Facility: MEDICAL CENTER | Age: 45
End: 2025-01-30
Attending: FAMILY MEDICINE
Payer: COMMERCIAL

## 2025-01-31 ENCOUNTER — HOSPITAL ENCOUNTER (OUTPATIENT)
Dept: RADIOLOGY | Facility: MEDICAL CENTER | Age: 45
End: 2025-01-31
Attending: FAMILY MEDICINE
Payer: COMMERCIAL

## 2025-01-31 DIAGNOSIS — R22.2 MASS IN CHEST: ICD-10-CM

## 2025-07-04 ENCOUNTER — PHARMACY VISIT (OUTPATIENT)
Dept: PHARMACY | Facility: MEDICAL CENTER | Age: 45
End: 2025-07-04
Payer: COMMERCIAL

## 2025-07-04 ENCOUNTER — OFFICE VISIT (OUTPATIENT)
Dept: URGENT CARE | Facility: CLINIC | Age: 45
End: 2025-07-04
Payer: COMMERCIAL

## 2025-07-04 VITALS
SYSTOLIC BLOOD PRESSURE: 128 MMHG | RESPIRATION RATE: 14 BRPM | BODY MASS INDEX: 41.44 KG/M2 | WEIGHT: 264 LBS | HEIGHT: 67 IN | HEART RATE: 99 BPM | OXYGEN SATURATION: 96 % | TEMPERATURE: 97.5 F | DIASTOLIC BLOOD PRESSURE: 80 MMHG

## 2025-07-04 DIAGNOSIS — B96.89 ACUTE BACTERIAL SINUSITIS: Primary | ICD-10-CM

## 2025-07-04 DIAGNOSIS — J01.90 ACUTE BACTERIAL SINUSITIS: Primary | ICD-10-CM

## 2025-07-04 PROCEDURE — 99213 OFFICE O/P EST LOW 20 MIN: CPT | Performed by: NURSE PRACTITIONER

## 2025-07-04 PROCEDURE — RXMED WILLOW AMBULATORY MEDICATION CHARGE: Performed by: NURSE PRACTITIONER

## 2025-07-04 RX ORDER — AZITHROMYCIN 250 MG/1
TABLET, FILM COATED ORAL
Qty: 6 TABLET | Refills: 0 | Status: SHIPPED | OUTPATIENT
Start: 2025-07-04

## 2025-07-04 ASSESSMENT — ENCOUNTER SYMPTOMS
RHINORRHEA: 1
SINUS PAIN: 0
COUGH: 1

## 2025-07-04 NOTE — PROGRESS NOTES
"Subjective:     Lauryn Aguillon is a 45 y.o. female who presents for Nasal Congestion (Congestion in the chest, sx started yesterday, cough off stuff, states has previous hx pneumonia )      URI   This is a new problem. The current episode started yesterday (Lauryn is a pleasant 45 year old female who presents to  today with compaints of cough and congestion that started 1 day. She does have a comprimised immune system.). Associated symptoms include congestion, coughing and rhinorrhea. Pertinent negatives include no sinus pain. Treatments tried: Dayquil.         Review of Systems   HENT:  Positive for congestion and rhinorrhea. Negative for sinus pain.    Respiratory:  Positive for cough.        PMH: Past Medical History[1]  ALLERGIES: Allergies[2]  SURGHX: Past Surgical History[3]  SOCHX: Social History[4]  FH:   Family History   Problem Relation Age of Onset    Heart Attack Father          Objective:   /80 (BP Location: Left arm, Patient Position: Sitting, BP Cuff Size: Adult)   Pulse 99   Temp 36.4 °C (97.5 °F) (Temporal)   Resp 14   Ht 1.702 m (5' 7\")   Wt 120 kg (264 lb)   SpO2 96%   BMI 41.35 kg/m²     Physical Exam  Vitals and nursing note reviewed.   Constitutional:       General: She is not in acute distress.     Appearance: Normal appearance. She is normal weight. She is ill-appearing. She is not toxic-appearing.   HENT:      Head: Normocephalic.      Right Ear: Tympanic membrane, ear canal and external ear normal.      Left Ear: Tympanic membrane, ear canal and external ear normal.      Nose: No congestion or rhinorrhea.      Mouth/Throat:      Mouth: Mucous membranes are moist.      Pharynx: No oropharyngeal exudate or posterior oropharyngeal erythema.   Eyes:      General:         Right eye: No discharge.         Left eye: No discharge.      Pupils: Pupils are equal, round, and reactive to light.   Cardiovascular:      Rate and Rhythm: Normal rate and regular rhythm.      Pulses: " Normal pulses.      Heart sounds: Normal heart sounds.   Pulmonary:      Effort: Pulmonary effort is normal. No respiratory distress.      Breath sounds: No stridor. No wheezing, rhonchi or rales.   Chest:      Chest wall: No tenderness.   Abdominal:      General: Abdomen is flat.   Musculoskeletal:         General: Normal range of motion.      Cervical back: Normal range of motion and neck supple.   Skin:     General: Skin is dry.   Neurological:      General: No focal deficit present.      Mental Status: She is alert and oriented to person, place, and time. Mental status is at baseline.   Psychiatric:         Mood and Affect: Mood normal.         Behavior: Behavior normal.         Thought Content: Thought content normal.         Judgment: Judgment normal.         Assessment/Plan:   Assessment    1. Acute bacterial sinusitis  azithromycin (ZITHROMAX) 250 MG Tab      Due to patient's immunocompromise state she was started on azithromycin for treatment. Prescriptions called into pharmacy. AVS printed and reviewed with pt. Red flags identified of when to seek care back in UC or ER including SOB, increased fever and worsening symptoms. Symptomatic treatment such as OTC Ibuprofen/ Acetaminophen, increased fluids, and humidifier encouraged Pt in agreement with care plan today.           [1]   Past Medical History:  Diagnosis Date    Graves disease     Hyperthyroidism     Palpitations    [2]   Allergies  Allergen Reactions    Propylthiouracil      Other Reaction(s): dizzy,light headed, nausea   [3]   Past Surgical History:  Procedure Laterality Date    OTHER ORTHOPEDIC SURGERY      left knee, left hip   [4]   Social History  Socioeconomic History    Marital status:    Tobacco Use    Smoking status: Some Days    Smokeless tobacco: Never    Tobacco comments:     vape some day   Vaping Use    Vaping status: Every Day    Devices: Pre-filled or refillable cartridge   Substance and Sexual Activity    Alcohol use: Never      Alcohol/week: 3.0 oz     Types: 5 Glasses of wine per week    Drug use: No    Sexual activity: Yes     Partners: Male

## 2025-07-30 ENCOUNTER — APPOINTMENT (OUTPATIENT)
Dept: RADIOLOGY | Facility: IMAGING CENTER | Age: 45
End: 2025-07-30
Attending: STUDENT IN AN ORGANIZED HEALTH CARE EDUCATION/TRAINING PROGRAM
Payer: COMMERCIAL

## 2025-07-30 ENCOUNTER — OFFICE VISIT (OUTPATIENT)
Dept: URGENT CARE | Facility: CLINIC | Age: 45
End: 2025-07-30
Payer: COMMERCIAL

## 2025-07-30 VITALS
HEART RATE: 112 BPM | BODY MASS INDEX: 41.95 KG/M2 | HEIGHT: 66 IN | OXYGEN SATURATION: 94 % | SYSTOLIC BLOOD PRESSURE: 123 MMHG | TEMPERATURE: 96.3 F | RESPIRATION RATE: 14 BRPM | WEIGHT: 261 LBS | DIASTOLIC BLOOD PRESSURE: 82 MMHG

## 2025-07-30 DIAGNOSIS — J40 BRONCHITIS: ICD-10-CM

## 2025-07-30 DIAGNOSIS — R05.1 ACUTE COUGH: Primary | ICD-10-CM

## 2025-07-30 RX ORDER — PREDNISONE 20 MG/1
40 TABLET ORAL DAILY
Qty: 10 TABLET | Refills: 0 | Status: SHIPPED | OUTPATIENT
Start: 2025-07-30 | End: 2025-08-04

## 2025-07-30 RX ORDER — DOXYCYCLINE HYCLATE 100 MG
100 TABLET ORAL 2 TIMES DAILY
Qty: 10 TABLET | Refills: 0 | Status: SHIPPED | OUTPATIENT
Start: 2025-07-30 | End: 2025-08-04

## 2025-07-30 RX ORDER — TOPIRAMATE 100 MG/1
90 TABLET, FILM COATED ORAL 2 TIMES DAILY
COMMUNITY

## 2025-07-30 RX ORDER — BENZONATATE 100 MG/1
100 CAPSULE ORAL 3 TIMES DAILY PRN
Qty: 60 CAPSULE | Refills: 0 | Status: SHIPPED | OUTPATIENT
Start: 2025-07-30

## 2025-07-30 ASSESSMENT — ENCOUNTER SYMPTOMS
COUGH: 1
SHORTNESS OF BREATH: 1
CHILLS: 0
FEVER: 0

## 2025-07-31 NOTE — PROGRESS NOTES
Subjective:   Lauryn Aguillon is a 45 y.o. female who presents for Cough (Bad cough that started early July. Chest congestion, dry hacking, pressure headaches)      HPI:  35-year-old female presents with ongoing cough.  Started early July was seen on July 4.  Given a prescription for azithromycin at that time for possible sinusitis.  Reports she has some improvement of her cough and was feeling slightly better over the next week or so though it was still present.  But recently went camping got caught in the rain and had some other things happen which prompted an acute worsening of the cough congestion and feeling of chest tightness significant under the weather.  She reports she is coughing so hard now she almost vomits.  Significant rib tenderness due to all the coughing and abdominal tenderness.  No blood in her sputum or cough.  She reports it was very productive yesterday but today feels dry.    Review of Systems   Constitutional:  Positive for malaise/fatigue. Negative for chills and fever.   HENT:  Positive for congestion.    Respiratory:  Positive for cough and shortness of breath.        Medications:    ALPRAZolam Tabs  azithromycin Tabs  benzonatate Caps  CENTRUM SILVER ULTRA WOMENS PO  cyclobenzaprine Tabs  doxycycline Tabs  hydrOXYzine HCl Tabs  metFORMIN Tabs  methimazole Tabs  predniSONE Tabs  propranolol Tabs  sertraline Tabs  topiramate Tabs    Allergies: Propylthiouracil    Problem List: Lauryn Aguillon does not have any pertinent problems on file.    Surgical History:  Past Surgical History:   Procedure Laterality Date    OTHER ORTHOPEDIC SURGERY      left knee, left hip       Past Social Hx: Lauryn Aguillon  reports that she has been smoking. She has never used smokeless tobacco. She reports that she does not drink alcohol and does not use drugs.     Past Family Hx:  Lauryn Aguillon family history includes Heart Attack in her father.     Problem list, medications, and allergies  "reviewed by myself today in Epic.     Objective:     /82 (BP Location: Left arm, Patient Position: Sitting, BP Cuff Size: Large adult long)   Pulse (!) 112   Temp (!) 35.7 °C (96.3 °F) (Temporal)   Resp 14   Ht 1.676 m (5' 6\")   Wt 118 kg (261 lb)   SpO2 94%   BMI 42.13 kg/m²     Physical Exam  Constitutional:       Appearance: Normal appearance.   HENT:      Head: Normocephalic and atraumatic.      Right Ear: Tympanic membrane normal.      Left Ear: Tympanic membrane normal.      Nose: Nose normal. No congestion or rhinorrhea.      Mouth/Throat:      Mouth: Mucous membranes are moist.      Pharynx: Oropharynx is clear.   Eyes:      Extraocular Movements: Extraocular movements intact.      Conjunctiva/sclera: Conjunctivae normal.   Cardiovascular:      Rate and Rhythm: Normal rate and regular rhythm.      Pulses: Normal pulses.      Heart sounds: Normal heart sounds.   Pulmonary:      Effort: Pulmonary effort is normal.      Comments: Coarse upper airway sounds  Neurological:      Mental Status: She is alert.         Assessment/Plan:     Diagnosis and associated orders:     1. Acute cough  DX-CHEST-2 VIEWS    predniSONE (DELTASONE) 20 MG Tab    doxycycline (VIBRAMYCIN) 100 MG Tab    benzonatate (TESSALON) 100 MG Cap      2. Bronchitis  predniSONE (DELTASONE) 20 MG Tab    doxycycline (VIBRAMYCIN) 100 MG Tab    benzonatate (TESSALON) 100 MG Cap         Comments/MDM:     1. Acute cough (Primary)  2. Bronchitis  - Likely bronchitis versus upper respiratory illness.  Had completed course of azithromycin approximately 20 days ago with mild improvement in return her symptoms.  - X-ray negative for any acute cardiopulmonary processes  - I discussed treatment with Tessalon and a steroid burst for her worsening cough and throat irritation.  - I discussed over-the-counter treatments with Mucinex and the dextromethorphan.  - If no improvement on the steroid and the benzonatate I would recommend additional course of " doxycycline at this time.  I will send a contingent antibiotics for no improvement or any worsening of symptoms or return of her significant nasal congestion that she had prior.  - I discussed that she has no significant.  She should follow-up with primary care provider for further workup of subacute cough.  - DX-CHEST-2 VIEWS; Future  - predniSONE (DELTASONE) 20 MG Tab; Take 2 Tablets by mouth every day for 5 days.  Dispense: 10 Tablet; Refill: 0  - doxycycline (VIBRAMYCIN) 100 MG Tab; Take 1 Tablet by mouth 2 times a day for 5 days.  Dispense: 10 Tablet; Refill: 0  - benzonatate (TESSALON) 100 MG Cap; Take 1 Capsule by mouth 3 times a day as needed for Cough.  Dispense: 60 Capsule; Refill: 0      - predniSONE (DELTASONE) 20 MG Tab; Take 2 Tablets by mouth every day for 5 days.  Dispense: 10 Tablet; Refill: 0  - doxycycline (VIBRAMYCIN) 100 MG Tab; Take 1 Tablet by mouth 2 times a day for 5 days.  Dispense: 10 Tablet; Refill: 0  - benzonatate (TESSALON) 100 MG Cap; Take 1 Capsule by mouth 3 times a day as needed for Cough.  Dispense: 60 Capsule; Refill: 0           Differential diagnosis, natural history, supportive care, and indications for immediate follow-up discussed.    Advised the patient to follow-up with the primary care physician for recheck, reevaluation, and consideration of further management.    Please note that this dictation was created using voice recognition software. I have made a reasonable attempt to correct obvious errors, but I expect that there are errors of grammar and possibly content that I did not discover before finalizing the note.    Aaron Arce M.D.